# Patient Record
Sex: FEMALE | Race: WHITE | NOT HISPANIC OR LATINO | Employment: PART TIME | URBAN - METROPOLITAN AREA
[De-identification: names, ages, dates, MRNs, and addresses within clinical notes are randomized per-mention and may not be internally consistent; named-entity substitution may affect disease eponyms.]

---

## 2017-11-05 ENCOUNTER — HOSPITAL ENCOUNTER (EMERGENCY)
Facility: HOSPITAL | Age: 47
Discharge: HOME | End: 2017-11-05
Attending: EMERGENCY MEDICINE | Admitting: EMERGENCY MEDICINE

## 2017-11-05 ENCOUNTER — APPOINTMENT (OUTPATIENT)
Dept: GENERAL RADIOLOGY | Facility: HOSPITAL | Age: 47
End: 2017-11-05

## 2017-11-05 VITALS
TEMPERATURE: 97.8 F | BODY MASS INDEX: 23.54 KG/M2 | WEIGHT: 150 LBS | RESPIRATION RATE: 16 BRPM | SYSTOLIC BLOOD PRESSURE: 121 MMHG | HEIGHT: 67 IN | HEART RATE: 89 BPM | DIASTOLIC BLOOD PRESSURE: 79 MMHG | OXYGEN SATURATION: 95 %

## 2017-11-05 DIAGNOSIS — S22.32XA CLOSED FRACTURE OF ONE RIB OF LEFT SIDE, INITIAL ENCOUNTER: Primary | ICD-10-CM

## 2017-11-05 DIAGNOSIS — R05.9 COUGH: ICD-10-CM

## 2017-11-05 PROCEDURE — 99284 EMERGENCY DEPT VISIT MOD MDM: CPT

## 2017-11-05 PROCEDURE — 94799 UNLISTED PULMONARY SVC/PX: CPT

## 2017-11-05 PROCEDURE — 71101 X-RAY EXAM UNILAT RIBS/CHEST: CPT

## 2017-11-05 RX ORDER — CITALOPRAM 10 MG/1
10 TABLET ORAL DAILY
COMMUNITY
End: 2020-07-23

## 2017-11-05 RX ORDER — HYDROCODONE BITARTRATE AND ACETAMINOPHEN 5; 325 MG/1; MG/1
1 TABLET ORAL EVERY 6 HOURS PRN
Qty: 16 TABLET | Refills: 0 | Status: SHIPPED | OUTPATIENT
Start: 2017-11-05 | End: 2019-09-05

## 2017-11-05 RX ORDER — PRAVASTATIN SODIUM 10 MG
10 TABLET ORAL DAILY
COMMUNITY
End: 2020-07-23

## 2017-11-05 RX ORDER — HYDROCODONE BITARTRATE AND ACETAMINOPHEN 7.5; 325 MG/1; MG/1
1 TABLET ORAL ONCE
Status: COMPLETED | OUTPATIENT
Start: 2017-11-05 | End: 2017-11-05

## 2017-11-05 RX ORDER — RAMIPRIL 2.5 MG/1
2.5 CAPSULE ORAL DAILY
COMMUNITY
End: 2020-07-23

## 2017-11-05 RX ADMIN — HYDROCODONE BITARTRATE AND ACETAMINOPHEN 1 TABLET: 7.5; 325 TABLET ORAL at 17:30

## 2017-11-05 NOTE — ED PROVIDER NOTES
"Pt c/o rib pain which began earlier today after she was coughing and felt a \"pop.\" She states that pain is worse with deep breathing. The pt has recently been ill with a fever which lasted for a few days. The pt was seen at the [facility] and was given cough syrup and an antibiotic. She states that her fever has since resolved. The pt is currently taking Augmentin.    PEx  Heart: RRR, no murmur  Lungs: clear bilaterally  Musc: Tenderness mid and lower lateral and anterior left chest wall  Abd: normal active bowel sounds, soft, non-tender    Agree with plan to discharge pt with pain medication and a incentive spirometer.       I supervised care provided by the midlevel provider.    We have discussed this patient's history, physical exam, and treatment plan.   I have reviewed the note and personally saw and examined the patient and agree with the plan of care.    Documentation assistance provided by pamela Mari for Dr. Lynn.  Information recorded by the scribe was done at my direction and has been verified and validated by me.       Terri Mari  11/05/17 7717       Monroe Lynn MD  11/05/17 5582    "

## 2017-11-05 NOTE — ED PROVIDER NOTES
"EMERGENCY DEPARTMENT ENCOUNTER    CHIEF COMPLAINT  Chief Complaint: L lateral chest pain  History given by:Pt  History limited by:Nothing  Room Number: 14/14  PMD: Monroe Luna MD      HPI:  Pt is a 47 y.o. female who presents with L lateral chest pain and \"pop\" after a coughing spell an hour PTA. She reports her rib pain is worse with deep breath. Pt reports having the flu shot two weeks ago with persistent cough since. She reports a fever that lasted 4-5 days after her flu shot that has since resolved. She reports she is on Augmentin.     Duration: PTA  Timing:constant  Location:L ribs  Radiation:none  Quality:\"pop\"  Intensity/Severity:moderate  Progression:unchanged  Associated Symptoms:cough, fever lasting 4-5 days that has resolved PTA  Aggravating Factors:deep breath  Alleviating Factors:none  Previous Episodes:None stated  Treatment before arrival:Pt reports having a flu shot two weeks ago which gave her a persistent cough    MEDICAL RECORD REVIEW  Pt has a hx of diabetes.    PAST MEDICAL HISTORY  Active Ambulatory Problems     Diagnosis Date Noted   • No Active Ambulatory Problems     Resolved Ambulatory Problems     Diagnosis Date Noted   • No Resolved Ambulatory Problems     Past Medical History:   Diagnosis Date   • Depression    • Diabetes mellitus    • Hyperlipidemia        PAST SURGICAL HISTORY  History reviewed. No pertinent surgical history.    FAMILY HISTORY  History reviewed. No pertinent family history.    SOCIAL HISTORY  Social History     Social History   • Marital status:      Spouse name: N/A   • Number of children: N/A   • Years of education: N/A     Occupational History   • Not on file.     Social History Main Topics   • Smoking status: Never Smoker   • Smokeless tobacco: Not on file   • Alcohol use No   • Drug use: No   • Sexual activity: Not on file     Other Topics Concern   • Not on file     Social History Narrative   • No narrative on file "       ALLERGIES  Oxycodone-acetaminophen    REVIEW OF SYSTEMS  Review of Systems   Constitutional: Positive for fever (resolved PTA).   Respiratory: Positive for cough. Negative for shortness of breath.    Cardiovascular: Positive for chest pain (L lateral ribs).       PHYSICAL EXAM  ED Triage Vitals   Temp Heart Rate Resp BP SpO2   11/05/17 1436 11/05/17 1436 11/05/17 1436 11/05/17 1436 11/05/17 1436   97.8 °F (36.6 °C) 108 18 119/85 99 %      Temp src Heart Rate Source Patient Position BP Location FiO2 (%)   11/05/17 1436 11/05/17 1436 -- -- --   Tympanic Monitor          Physical Exam   Constitutional: She is oriented to person, place, and time and well-developed, well-nourished, and in no distress. No distress.   HENT:   Head: Normocephalic and atraumatic.   Mouth/Throat: Oropharynx is clear and moist.   Eyes: EOM are normal.   Neck: Normal range of motion. Neck supple.   Cardiovascular: Normal rate and regular rhythm.    Pulmonary/Chest: Effort normal and breath sounds normal. No respiratory distress. She has no wheezes. She exhibits tenderness (L lateral rib).   Abdominal: Soft. She exhibits no distension. There is tenderness in the left upper quadrant. There is no rebound.   Musculoskeletal: Normal range of motion. She exhibits no edema.   Lymphadenopathy:     She has no cervical adenopathy.   Neurological: She is alert and oriented to person, place, and time.   Skin: Skin is warm and dry. No rash noted. No pallor.   Psychiatric: Mood, memory, affect and judgment normal.   Nursing note and vitals reviewed.      RADIOLOGY  XR Ribs Left With PA Chest   Preliminary Result   1.  Left 7th rib fracture, may be old.   2.  No definite acute rib fractures or pneumothorax is seen.              I ordered the above noted radiological studies and reviewed the images on the PACS system.    COURSE & MEDICAL DECISION MAKING  Pertinent Imaging studies that were ordered and reviewed are noted above.  Results were  "reviewed/discussed with the patient and they were also made aware of online assess.  Pt also made aware that some labs, such as cultures, will not be resulted during ER visit and follow up with PMD is necessary.       PROGRESS AND CONSULTS    Progress Notes:    ED Course     1650  Reviewed pt's history and workup with Dr. Lynn.  After a bedside evaluation; Dr Lynn agrees with the plan of care    1714  Informed pt of her XR L rib results with shows a L 7th rib fracture. Informed pt of the plan to discharge home with an incentive spirometer and pain medication. Work note provided. All questions answered.     1714  The patient's history, physical exam, and imaging findings were discussed with the physician, who also performed a face to face history and physical exam.  I discussed all results and noted any abnormalities with patient.  Discussed absoute need to recheck abnormalities with their family physician.  I answered any of the patient's questions.  Discussed plan for discharge, as there is no emergent indication for admission.  Pt is agreeable and understands need for follow up and repeat testing.  Pt is aware that discharge does not mean that nothing is wrong but it indicates no emergency is present and they must continue care with their family physician.  Pt is discharged with instructions to follow up with primary care doctor to have their blood pressure rechecked.     1720  Incentive Spirometry ordered. Norco ordered for pain.    MEDICATIONS GIVEN IN ER  Medications   HYDROcodone-acetaminophen (NORCO) 7.5-325 MG per tablet 1 tablet (1 tablet Oral Given 11/5/17 1730)       /79  Pulse 89  Temp 97.8 °F (36.6 °C) (Tympanic)   Resp 16  Ht 67\" (170.2 cm)  Wt 150 lb (68 kg)  SpO2 95%  BMI 23.49 kg/m2      DIAGNOSIS  Final diagnoses:   Closed fracture of one rib of left side, initial encounter   Cough       FOLLOW UP   Monroe Luna MD  [address]  [facility] " [id number]  [phone]            RX     Medication List      New Prescriptions          HYDROcodone-acetaminophen 5-325 MG per tablet   Commonly known as:  NORCO   Take 1 tablet by mouth Every 6 (Six) Hours As Needed for Moderate Pain .         Stop          azithromycin 250 MG tablet   Commonly known as:  ZITHROMAX           Risks, benefits, alternatives discussed with patient.  Pt consents to treatment and agrees to follow up with PMD tomorrow for further care and any other prescriptions.       Documentation assistance provided by pamela Moreno for Digna Varghese PA-C.  Information recorded by the isaiasibalexsander was done at my direction and has been verified and validated by me.  Electronically signed by Biju Moreno on 11/5/2017 at time 6:23 PM       Biju Moreno  11/05/17 1827       Digna Varghese PA-C  11/05/17 1828

## 2017-11-05 NOTE — ED NOTES
"Patient states \"Earlier today I coughed and I felt a pop under my left boob and since then I've had pain in my left ribs.\" Patient denies any injury or other symptoms.     Monika Adhikari RN  11/05/17 4497    "

## 2017-11-05 NOTE — DISCHARGE INSTRUCTIONS
PLEASE READ AND REVIEW ALL DISCHARGE INSTRUCTIONS.     Please follow up with your primary care physician for any further evaluation/treatment and further management of your blood pressure.     Recheck in emergency department for any worsening and/or concerning symptoms.     Take all prescribed medicine as written and continue chronic medication.    Use incentive spirometer 4 times daily.  Continue antibiotic.  Pain medicine as needed.    DO NOT DRIVE WHILE TAKING PAIN MEDICINE.

## 2019-09-05 ENCOUNTER — APPOINTMENT (OUTPATIENT)
Dept: GENERAL RADIOLOGY | Facility: HOSPITAL | Age: 49
End: 2019-09-05

## 2019-09-05 ENCOUNTER — HOSPITAL ENCOUNTER (EMERGENCY)
Facility: HOSPITAL | Age: 49
Discharge: HOME | End: 2019-09-06
Attending: EMERGENCY MEDICINE | Admitting: EMERGENCY MEDICINE

## 2019-09-05 DIAGNOSIS — Z87.81 HISTORY OF RIB FRACTURE: ICD-10-CM

## 2019-09-05 DIAGNOSIS — S29.011A MUSCLE STRAIN OF CHEST WALL, INITIAL ENCOUNTER: Primary | ICD-10-CM

## 2019-09-05 PROCEDURE — 71101 X-RAY EXAM UNILAT RIBS/CHEST: CPT

## 2019-09-05 PROCEDURE — 99283 EMERGENCY DEPT VISIT LOW MDM: CPT

## 2019-09-05 RX ORDER — TRAMADOL HYDROCHLORIDE 50 MG/1
50 TABLET ORAL EVERY 6 HOURS PRN
Qty: 15 TABLET | Refills: 0 | Status: SHIPPED | OUTPATIENT
Start: 2019-09-05 | End: 2020-07-23

## 2019-09-05 RX ORDER — HYDROCODONE BITARTRATE AND ACETAMINOPHEN 7.5; 325 MG/1; MG/1
1 TABLET ORAL ONCE
Status: COMPLETED | OUTPATIENT
Start: 2019-09-05 | End: 2019-09-05

## 2019-09-05 RX ORDER — IBUPROFEN 600 MG/1
600 TABLET ORAL EVERY 8 HOURS PRN
Qty: 21 TABLET | Refills: 0 | Status: SHIPPED | OUTPATIENT
Start: 2019-09-05 | End: 2020-07-23

## 2019-09-05 RX ADMIN — HYDROCODONE BITARTRATE AND ACETAMINOPHEN 1 TABLET: 7.5; 325 TABLET ORAL at 23:28

## 2019-09-06 VITALS
HEART RATE: 98 BPM | SYSTOLIC BLOOD PRESSURE: 115 MMHG | OXYGEN SATURATION: 96 % | HEIGHT: 67 IN | TEMPERATURE: 97.5 F | BODY MASS INDEX: 23.49 KG/M2 | DIASTOLIC BLOOD PRESSURE: 70 MMHG | RESPIRATION RATE: 18 BRPM

## 2019-09-06 NOTE — ED PROVIDER NOTES
" EMERGENCY DEPARTMENT ENCOUNTER    CHIEF COMPLAINT  Chief Complaint: Left sided rib pain  History given by: Pt  History limited by: None  Room Number: 01/01  PMD: Monroe Luna MD      HPI:  Pt is a 49 y.o. 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 Pt heard a \"pop\" when she was lifting the patient. The pain radiates into her back and is aggravated with movement and deep inspiration.  Pt denies fever, CP, SOA, and abd pain.     Duration:  Several hours  Onset: gradual  Timing: constant   Location: left ribs  Quality: soreness  Intensity/Severity: moderate   Progression: worsening   Associated Symptoms: none  Aggravating Factors: movement   Alleviating Factors: none  Previous Episodes: h/o left 6th rib fracure  Treatment before arrival: none stated     PAST MEDICAL HISTORY  Active Ambulatory Problems     Diagnosis Date Noted   • No Active Ambulatory Problems     Resolved Ambulatory Problems     Diagnosis Date Noted   • No Resolved Ambulatory Problems     Past Medical History:   Diagnosis Date   • Depression    • Diabetes mellitus (CMS/[facility])    • Hyperlipidemia        PAST SURGICAL HISTORY  No past surgical history on file.    FAMILY HISTORY  No family history on file.    SOCIAL HISTORY  Social History     Socioeconomic History   • Marital status:      Spouse name: Not on file   • Number of children: Not on file   • Years of education: Not on file   • Highest education level: Not on file   Tobacco Use   • Smoking status: Never Smoker   Substance and Sexual Activity   • Alcohol use: No   • Drug use: No       ALLERGIES  Oxycodone-acetaminophen    REVIEW OF SYSTEMS  Review of Systems   Constitutional: Negative for fever.   HENT: Negative for sore throat.    Eyes: Negative.    Respiratory: Negative for cough and shortness of breath.    Cardiovascular: Negative for chest pain.   Gastrointestinal: Negative for " abdominal pain, diarrhea, nausea and vomiting.   Genitourinary: Negative for dysuria.   Musculoskeletal: Positive for arthralgias (left ribs). Negative for neck pain.   Skin: Negative for rash.   Allergic/Immunologic: Negative.    Neurological: Negative for weakness, numbness and headaches.   Hematological: Negative.    Psychiatric/Behavioral: Negative.    All other systems reviewed and are negative.      PHYSICAL EXAM  ED Triage Vitals [09/05/19 2051]   Temp Heart Rate Resp BP SpO2   97.4 °F (36.3 °C) 111 18 112/73 95 %      Temp src Heart Rate Source Patient Position BP Location FiO2 (%)   Tympanic Monitor -- -- --       Physical Exam   Constitutional: She is oriented to person, place, and time. No distress.   HENT:   Head: Normocephalic and atraumatic.   Eyes: EOM are normal. Pupils are equal, round, and reactive to light.   Neck: Normal range of motion. Neck supple.   Cardiovascular: Normal rate, regular rhythm and normal heart sounds.   Pulmonary/Chest: Effort normal and breath sounds normal. No respiratory distress. She exhibits tenderness (left lateral chest wall). She exhibits no crepitus.   Abdominal: Soft. Bowel sounds are normal. She exhibits no distension. There is no tenderness. There is no rebound and no guarding.   Musculoskeletal: Normal range of motion. She exhibits no edema.   Neurological: She is alert and oriented to person, place, and time. She has normal sensation and normal strength.   Skin: Skin is warm and dry. No rash noted.   Psychiatric: Mood and affect normal.   Nursing note and vitals reviewed.        RADIOLOGY  XR Ribs Left With PA Chest   Final Result   Left seventh rib fracture appears old and well-healed.       This report was finalized on 9/5/2019 11:37 PM by Earl Galeano M.D.               I ordered the above noted radiological studies. Interpreted by radiologist. Reviewed by me in PACS.       PROCEDURES  Procedures      PROGRESS AND CONSULTS   10:22 PM  XR left ribs ordered.      11:10 PM  Norco ordered for pain.     MEDICAL DECISION MAKING  Results were reviewed/discussed with the patient and they were also made aware of online access. Pt also made aware that some labs, such as cultures, will not be resulted during ER visit and follow up with PMD is necessary.     MDM  Number of Diagnoses or Management Options     Amount and/or Complexity of Data Reviewed  Tests in the radiology section of CPT®: ordered and reviewed  Decide to obtain previous medical records or to obtain history from someone other than the patient: yes  Review and summarize past medical records: yes           DIAGNOSIS  Final diagnoses:   Muscle strain of chest wall, initial encounter   History of rib fracture       DISPOSITION  DISCHARGE    Patient discharged in stable condition.    Reviewed implications of results, diagnosis, meds, responsibility to follow up, warning signs and symptoms of possible worsening, potential complications and reasons to return to ER.    Patient/Family voiced understanding of above instructions.    Discussed plan for discharge, as there is no emergent indication for admission. Patient referred to primary care provider for BP management due to today's BP. Pt/family is agreeable and understands need for follow up and repeat testing.  Pt is aware that discharge does not mean that nothing is wrong but it indicates no emergency is present that requires admission and they must continue care with follow-up as given below or physician of their choice.     FOLLOW-UP  Monroe Luna MD  [address]  [place] [address]  [phone]    Schedule an appointment as soon as possible for a visit            Medication List      New Prescriptions    ibuprofen 600 MG tablet  Commonly known as:  ADVIL,MOTRIN  Take 1 tablet by mouth Every 8 (Eight) Hours As Needed for Moderate Pain .     traMADol 50 MG tablet  Commonly known as:  ULTRAM  Take 1 tablet by mouth Every 6 (Six) Hours As Needed  for Moderate Pain .        Stop    HYDROcodone-acetaminophen 5-325 MG per tablet  Commonly known as:  NORCO          Latest Documented Vital Signs:  As of 12:35 AM  BP- 115/70 HR- 98 Temp- 97.5 °F (36.4 °C) O2 sat- 96%    --  Documentation assistance provided by pamela Herron for Dr. Lynn.  Information recorded by the scribe was done at my direction and has been verified and validated by me.     Rossy Herron  09/05/19 0242       Rossy Herron  09/05/19 2565       Monroe Lynn MD  09/06/19 0036

## 2019-09-06 NOTE — DISCHARGE INSTRUCTIONS
Use medications as needed for pain.  Ice your left chest wall for up to 20 minutes at a time.  Please return to the emergency department if develop trouble breathing or fever.

## 2019-09-06 NOTE — ED NOTES
Pt was assisting pt back to bed and felt a pop in her left side, c/o rib pain.     Narda Lira RN  09/05/19 2049

## 2020-07-23 ENCOUNTER — OFFICE VISIT (OUTPATIENT)
Dept: FAMILY MEDICINE CLINIC | Facility: CLINIC | Age: 50
End: 2020-07-23

## 2020-07-23 VITALS
HEIGHT: 67 IN | BODY MASS INDEX: 29.02 KG/M2 | OXYGEN SATURATION: 98 % | WEIGHT: 184.9 LBS | DIASTOLIC BLOOD PRESSURE: 70 MMHG | TEMPERATURE: 98.2 F | HEART RATE: 70 BPM | SYSTOLIC BLOOD PRESSURE: 118 MMHG

## 2020-07-23 DIAGNOSIS — E78.5 HYPERLIPIDEMIA, UNSPECIFIED HYPERLIPIDEMIA TYPE: ICD-10-CM

## 2020-07-23 DIAGNOSIS — M81.0 OSTEOPOROSIS, UNSPECIFIED OSTEOPOROSIS TYPE, UNSPECIFIED PATHOLOGICAL FRACTURE PRESENCE: ICD-10-CM

## 2020-07-23 DIAGNOSIS — I10 ESSENTIAL HYPERTENSION: Primary | ICD-10-CM

## 2020-07-23 DIAGNOSIS — Z13.0 SCREENING FOR IRON DEFICIENCY ANEMIA: ICD-10-CM

## 2020-07-23 DIAGNOSIS — F32.A DEPRESSION, UNSPECIFIED DEPRESSION TYPE: ICD-10-CM

## 2020-07-23 DIAGNOSIS — M72.2 PLANTAR FASCIITIS: ICD-10-CM

## 2020-07-23 DIAGNOSIS — Z12.39 BREAST CANCER SCREENING: ICD-10-CM

## 2020-07-23 DIAGNOSIS — E11.9 TYPE 2 DIABETES MELLITUS WITHOUT COMPLICATION, WITHOUT LONG-TERM CURRENT USE OF INSULIN (HCC): ICD-10-CM

## 2020-07-23 PROCEDURE — 99204 OFFICE O/P NEW MOD 45 MIN: CPT | Performed by: NURSE PRACTITIONER

## 2020-07-23 RX ORDER — LORATADINE 10 MG/1
10 TABLET ORAL DAILY
COMMUNITY

## 2020-07-23 RX ORDER — ALENDRONATE SODIUM 70 MG/1
70 TABLET ORAL
COMMUNITY
Start: 2019-01-14 | End: 2020-07-23 | Stop reason: SDUPTHER

## 2020-07-23 RX ORDER — SODIUM PHOSPHATE,MONO-DIBASIC 19G-7G/118
ENEMA (ML) RECTAL DAILY
COMMUNITY

## 2020-07-23 RX ORDER — GUAIFENESIN 600 MG/1
TABLET, EXTENDED RELEASE ORAL
COMMUNITY

## 2020-07-23 RX ORDER — ASPIRIN 81 MG/1
81 TABLET ORAL DAILY
COMMUNITY

## 2020-07-23 RX ORDER — CITALOPRAM 20 MG/1
20 TABLET ORAL DAILY
COMMUNITY
Start: 2019-01-14 | End: 2020-07-23 | Stop reason: SDUPTHER

## 2020-07-23 RX ORDER — ALENDRONATE SODIUM 70 MG/1
70 TABLET ORAL
Qty: 12 TABLET | Refills: 3 | Status: SHIPPED | OUTPATIENT
Start: 2020-07-23 | End: 2021-07-07

## 2020-07-23 RX ORDER — PRAVASTATIN SODIUM 80 MG/1
80 TABLET ORAL DAILY
Qty: 90 TABLET | Refills: 3 | Status: SHIPPED | OUTPATIENT
Start: 2020-07-23

## 2020-07-23 RX ORDER — MELATONIN
COMMUNITY
Start: 2020-04-16

## 2020-07-23 RX ORDER — CITALOPRAM 20 MG/1
20 TABLET ORAL DAILY
Qty: 90 TABLET | Refills: 3 | Status: SHIPPED | OUTPATIENT
Start: 2020-07-23

## 2020-07-23 RX ORDER — PRAVASTATIN SODIUM 80 MG/1
80 TABLET ORAL DAILY
COMMUNITY
Start: 2019-01-14 | End: 2020-07-23 | Stop reason: SDUPTHER

## 2020-07-23 RX ORDER — RAMIPRIL 2.5 MG/1
CAPSULE ORAL
COMMUNITY
Start: 2020-04-01

## 2020-07-23 RX ORDER — RIBOFLAVIN (VITAMIN B2) 100 MG
TABLET ORAL DAILY
COMMUNITY
End: 2020-11-03

## 2020-07-23 NOTE — PROGRESS NOTES
"Subjective   Seble Delgadillo is a 50 y.o. female.     History of Present Illness   New patient presenting to me today    Left heel pain on the bottom of her foot that achy at times.  Hurts worse in the morning.  Hurting for a month.  Off and on achy sharp pain.  She only wears tennis shoes bc of foot pain. Ps 7 tried IBU and it does help a little and she has a gel pad in shoes.    Patient has not been to the doctor in a long time needing all of her medications refilled.  She has been a diabetic for a while has not had her A1c checked for a while but she is taking her medications daily as directed.    She is also on Celexa 20 mg which she is using daily as directed for depression and is working well without any side effects.    She is also on blood pressure medication with a well-controlled blood pressure no issues with that at home.    She is taking a statin for high cholesterol and diabetes which she is using as directed.  She is fasting for blood work today.    She is on Fosamax for history of several rib fractures she states she she has no idea if she is had a bone density scan she is done with Fosamax for 2 years we will go ahead and get her set up for 1 of these.  She is also at a date for her mammogram we will set her up for 1 of these.  Her last colonoscopy was 2 years ago and she is required to have those every 5 it was normal.    The following portions of the patient's history were reviewed and updated as appropriate: allergies, current medications, past social history and problem list.    Review of Systems   Musculoskeletal:        Foot pain   All other systems reviewed and are negative.      Objective   /70   Pulse 70   Temp 98.2 °F (36.8 °C)   Ht 170.2 cm (67.01\")   Wt 83.9 kg (184 lb 14.4 oz)   SpO2 98%   BMI 28.95 kg/m²   Physical Exam   Constitutional: She is oriented to person, place, and time. Vital signs are normal. She appears well-developed and well-nourished. No distress.   HENT: "   Head: Normocephalic and atraumatic. Hair is normal.   Right Ear: Hearing, tympanic membrane, external ear and ear canal normal. No drainage. No decreased hearing is noted.   Left Ear: Hearing, tympanic membrane, external ear and ear canal normal. No decreased hearing is noted.   Nose: No nasal deformity.   Mouth/Throat: Oropharynx is clear and moist.   Eyes: Pupils are equal, round, and reactive to light. Conjunctivae, EOM and lids are normal. Right eye exhibits no discharge. Left eye exhibits no discharge.   Neck: Normal range of motion. Neck supple. No JVD present. No tracheal deviation present. No thyromegaly present.   Cardiovascular: Normal rate, regular rhythm, normal heart sounds, intact distal pulses and normal pulses. Exam reveals no gallop and no friction rub.   No murmur heard.  Pulmonary/Chest: Effort normal and breath sounds normal. No respiratory distress. She has no wheezes. She has no rales. She exhibits no tenderness.   Abdominal: Soft. Bowel sounds are normal. She exhibits no distension and no mass. There is no tenderness. There is no rebound and no guarding. No hernia.   Musculoskeletal: Normal range of motion. She exhibits no edema, tenderness or deformity.   Lymphadenopathy:     She has no cervical adenopathy.   Neurological: She is alert and oriented to person, place, and time. She has normal reflexes. She displays normal reflexes. No cranial nerve deficit. She exhibits normal muscle tone. Coordination and gait normal.   Skin: Skin is warm and dry. No rash noted. She is not diaphoretic. No erythema.   Psychiatric: She has a normal mood and affect. Her behavior is normal. Judgment and thought content normal.   Vitals reviewed.      Assessment/Plan      Diagnosis Plan   1. Essential hypertension     2. Type 2 diabetes mellitus without complication, without long-term current use of insulin (CMS/[facility])  Comprehensive Metabolic Panel    Hemoglobin A1c   3. Depression, unspecified depression type   citalopram (CeleXA) 20 MG tablet   4. Hyperlipidemia, unspecified hyperlipidemia type  Comprehensive Metabolic Panel    Lipid Panel With LDL / HDL Ratio    pravastatin (PRAVACHOL) 80 MG tablet   5. Osteoporosis, unspecified osteoporosis type, unspecified pathological fracture presence  DEXA Bone Density Axial    alendronate (FOSAMAX) 70 MG tablet   6. Screening for iron deficiency anemia  CBC & Differential   7. Breast cancer screening  Mammo Screening Bilateral With CAD   8. Plantar fasciitis       Follow up after labs   Cont with meds as directed  Ice, IBU, inserts rto for injection if no improvement     Bryan Dowell, TIMOTHY  7/23/2020

## 2020-07-24 LAB
ALBUMIN SERPL-MCNC: 4.6 G/DL (ref 3.5–5.2)
ALBUMIN/GLOB SERPL: 1.5 G/DL
ALP SERPL-CCNC: 96 U/L (ref 39–117)
ALT SERPL-CCNC: 29 U/L (ref 1–33)
AST SERPL-CCNC: 20 U/L (ref 1–32)
BASOPHILS # BLD AUTO: 0.03 10*3/MM3 (ref 0–0.2)
BASOPHILS NFR BLD AUTO: 0.6 % (ref 0–1.5)
BILIRUB SERPL-MCNC: 0.5 MG/DL (ref 0–1.2)
BUN SERPL-MCNC: 13 MG/DL (ref 6–20)
BUN/CREAT SERPL: 17.6 (ref 7–25)
CALCIUM SERPL-MCNC: 9.5 MG/DL (ref 8.6–10.5)
CHLORIDE SERPL-SCNC: 99 MMOL/L (ref 98–107)
CHOLEST SERPL-MCNC: 234 MG/DL (ref 0–200)
CO2 SERPL-SCNC: 28.3 MMOL/L (ref 22–29)
CREAT SERPL-MCNC: 0.74 MG/DL (ref 0.57–1)
EOSINOPHIL # BLD AUTO: 0.17 10*3/MM3 (ref 0–0.4)
EOSINOPHIL NFR BLD AUTO: 3.3 % (ref 0.3–6.2)
ERYTHROCYTE [DISTWIDTH] IN BLOOD BY AUTOMATED COUNT: 12.8 % (ref 12.3–15.4)
GLOBULIN SER CALC-MCNC: 3 GM/DL
GLUCOSE SERPL-MCNC: 279 MG/DL (ref 65–99)
HBA1C MFR BLD: 10.3 % (ref 4.8–5.6)
HCT VFR BLD AUTO: 45.8 % (ref 34–46.6)
HDLC SERPL-MCNC: 55 MG/DL (ref 40–60)
HGB BLD-MCNC: 15.1 G/DL (ref 12–15.9)
IMM GRANULOCYTES # BLD AUTO: 0.01 10*3/MM3 (ref 0–0.05)
IMM GRANULOCYTES NFR BLD AUTO: 0.2 % (ref 0–0.5)
LDLC SERPL CALC-MCNC: 138 MG/DL (ref 0–100)
LDLC/HDLC SERPL: 2.51 {RATIO}
LYMPHOCYTES # BLD AUTO: 1.52 10*3/MM3 (ref 0.7–3.1)
LYMPHOCYTES NFR BLD AUTO: 29.2 % (ref 19.6–45.3)
MCH RBC QN AUTO: 30.5 PG (ref 26.6–33)
MCHC RBC AUTO-ENTMCNC: 33 G/DL (ref 31.5–35.7)
MCV RBC AUTO: 92.5 FL (ref 79–97)
MONOCYTES # BLD AUTO: 0.41 10*3/MM3 (ref 0.1–0.9)
MONOCYTES NFR BLD AUTO: 7.9 % (ref 5–12)
NEUTROPHILS # BLD AUTO: 3.06 10*3/MM3 (ref 1.7–7)
NEUTROPHILS NFR BLD AUTO: 58.8 % (ref 42.7–76)
NRBC BLD AUTO-RTO: 0 /100 WBC (ref 0–0.2)
PLATELET # BLD AUTO: 190 10*3/MM3 (ref 140–450)
POTASSIUM SERPL-SCNC: 4 MMOL/L (ref 3.5–5.2)
PROT SERPL-MCNC: 7.6 G/DL (ref 6–8.5)
RBC # BLD AUTO: 4.95 10*6/MM3 (ref 3.77–5.28)
SODIUM SERPL-SCNC: 139 MMOL/L (ref 136–145)
TRIGL SERPL-MCNC: 206 MG/DL (ref 0–150)
VLDLC SERPL CALC-MCNC: 41.2 MG/DL
WBC # BLD AUTO: 5.2 10*3/MM3 (ref 3.4–10.8)

## 2020-07-31 NOTE — TELEPHONE ENCOUNTER
Pt was seen on 7/23 with Bryan and needs these meds filled     JARDIANCE 25 MG   1 TABLET A DAY     RANITIDINE   150MG I TAB TWEICE A DAY

## 2020-08-05 ENCOUNTER — TELEPHONE (OUTPATIENT)
Dept: FAMILY MEDICINE CLINIC | Facility: CLINIC | Age: 50
End: 2020-08-05

## 2020-08-05 NOTE — TELEPHONE ENCOUNTER
Patient wanted a refill of zantac 150mg prescription is not an active medication on her list. Do you want to give her a prescription or is that one of the medications that was pulled off market due to side effects?

## 2020-08-06 ENCOUNTER — TELEPHONE (OUTPATIENT)
Dept: FAMILY MEDICINE CLINIC | Facility: CLINIC | Age: 50
End: 2020-08-06

## 2020-08-06 RX ORDER — PEN NEEDLE, DIABETIC 33 GX5/32"
1 NEEDLE, DISPOSABLE MISCELLANEOUS NIGHTLY
Qty: 100 EACH | Refills: 3 | Status: SHIPPED | OUTPATIENT
Start: 2020-08-06 | End: 2020-11-03

## 2020-08-06 NOTE — TELEPHONE ENCOUNTER
It wont let me order this medication it says it has been pulled off the market gives option for famotidine

## 2020-08-07 RX ORDER — OMEPRAZOLE 20 MG/1
20 CAPSULE, DELAYED RELEASE ORAL DAILY
Qty: 90 CAPSULE | Refills: 3 | Status: SHIPPED | OUTPATIENT
Start: 2020-08-07 | End: 2020-11-03

## 2020-10-26 RX ORDER — SITAGLIPTIN 100 MG/1
TABLET, FILM COATED ORAL
Qty: 90 TABLET | Refills: 2 | Status: SHIPPED | OUTPATIENT
Start: 2020-10-26

## 2020-11-03 ENCOUNTER — OFFICE VISIT (OUTPATIENT)
Dept: FAMILY MEDICINE CLINIC | Facility: CLINIC | Age: 50
End: 2020-11-03

## 2020-11-03 VITALS
BODY MASS INDEX: 29.41 KG/M2 | SYSTOLIC BLOOD PRESSURE: 146 MMHG | TEMPERATURE: 97.1 F | DIASTOLIC BLOOD PRESSURE: 80 MMHG | OXYGEN SATURATION: 97 % | HEART RATE: 93 BPM | HEIGHT: 67 IN | WEIGHT: 187.4 LBS

## 2020-11-03 DIAGNOSIS — K21.9 GASTROESOPHAGEAL REFLUX DISEASE WITHOUT ESOPHAGITIS: ICD-10-CM

## 2020-11-03 DIAGNOSIS — Z12.31 ENCOUNTER FOR SCREENING MAMMOGRAM FOR MALIGNANT NEOPLASM OF BREAST: ICD-10-CM

## 2020-11-03 DIAGNOSIS — Z91.09 ENVIRONMENTAL ALLERGIES: ICD-10-CM

## 2020-11-03 DIAGNOSIS — E78.5 HYPERLIPIDEMIA, UNSPECIFIED HYPERLIPIDEMIA TYPE: ICD-10-CM

## 2020-11-03 DIAGNOSIS — G47.00 INSOMNIA, UNSPECIFIED TYPE: ICD-10-CM

## 2020-11-03 DIAGNOSIS — E11.9 TYPE 2 DIABETES MELLITUS WITHOUT COMPLICATION, WITHOUT LONG-TERM CURRENT USE OF INSULIN (HCC): Primary | ICD-10-CM

## 2020-11-03 DIAGNOSIS — I10 ESSENTIAL HYPERTENSION: ICD-10-CM

## 2020-11-03 LAB — HBA1C MFR BLD: 12.2 %

## 2020-11-03 PROCEDURE — 83036 HEMOGLOBIN GLYCOSYLATED A1C: CPT | Performed by: NURSE PRACTITIONER

## 2020-11-03 PROCEDURE — 99214 OFFICE O/P EST MOD 30 MIN: CPT | Performed by: NURSE PRACTITIONER

## 2020-11-03 RX ORDER — PEN NEEDLE, DIABETIC 32GX 5/32"
NEEDLE, DISPOSABLE MISCELLANEOUS
COMMUNITY
Start: 2020-10-30

## 2020-11-03 RX ORDER — FLUTICASONE PROPIONATE 50 MCG
2 SPRAY, SUSPENSION (ML) NASAL DAILY
Qty: 3 BOTTLE | Refills: 3 | Status: SHIPPED | OUTPATIENT
Start: 2020-11-03

## 2020-11-03 RX ORDER — DOXEPIN HYDROCHLORIDE 10 MG/1
10 CAPSULE ORAL NIGHTLY
Qty: 90 CAPSULE | Refills: 3 | Status: SHIPPED | OUTPATIENT
Start: 2020-11-03

## 2020-11-03 RX ORDER — OMEPRAZOLE 40 MG/1
40 CAPSULE, DELAYED RELEASE ORAL DAILY
Qty: 90 CAPSULE | Refills: 3 | Status: SHIPPED | OUTPATIENT
Start: 2020-11-03

## 2020-11-03 NOTE — PROGRESS NOTES
"Subjective   Seble Delgadillo is a 50 y.o. female.     History of Present Illness    Since the last visit, she has overall felt well.  She has Essential Hypertension and well controlled on current medication, DMII not controlled on current regimen and will add/adjust medication, work on diet and exercise, get follow up labs and Gerd is not controlled and will adjust medication, allergies not controlled will add medication.  she has been compliant with current medications have reviewed them.  The patient denies medication side effects.  Will refill medications. /80   Pulse 93   Temp 97.1 °F (36.2 °C)   Ht 170.2 cm (67.01\")   Wt 85 kg (187 lb 6.4 oz)   SpO2 97%   BMI 29.34 kg/m²     Results for orders placed or performed in visit on 11/03/20   POC Glycosylated Hemoglobin (Hb A1C)    Specimen: Blood   Result Value Ref Range    Hemoglobin A1C 12.2 %     Pt with complaints of insomnia patient states that she can fall asleep very easily but she wakes up several times throughout the night and takes her quite a while to get back to sleep.  She never use anything for insomnia in the past besides over-the-counter melatonin which does help her fall asleep it does not help her stay asleep.    The following portions of the patient's history were reviewed and updated as appropriate: allergies, current medications, past social history and problem list.    Review of Systems   HENT: Positive for postnasal drip and rhinorrhea.    All other systems reviewed and are negative.      Objective   /80   Pulse 93   Temp 97.1 °F (36.2 °C)   Ht 170.2 cm (67.01\")   Wt 85 kg (187 lb 6.4 oz)   SpO2 97%   BMI 29.34 kg/m²   Physical Exam  Vitals signs reviewed.   Constitutional:       General: She is not in acute distress.     Appearance: She is well-developed.   HENT:      Head: Normocephalic.   Cardiovascular:      Rate and Rhythm: Normal rate and regular rhythm.      Heart sounds: Normal heart sounds.   Pulmonary:      " Effort: Pulmonary effort is normal.      Breath sounds: Normal breath sounds.   Neurological:      Mental Status: She is alert and oriented to person, place, and time.      Gait: Gait normal.   Psychiatric:         Behavior: Behavior normal.         Thought Content: Thought content normal.         Judgment: Judgment normal.         Assessment/Plan      Diagnosis Plan   1. Type 2 diabetes mellitus without complication, without long-term current use of insulin (CMS/[facility])  POC Glycosylated Hemoglobin (Hb A1C)    C-Peptide   2. Hyperlipidemia, unspecified hyperlipidemia type     3. Essential hypertension     4. Gastroesophageal reflux disease without esophagitis  omeprazole (PrilOSEC) 40 MG capsule   5. Environmental allergies  fluticasone (Flonase) 50 MCG/ACT nasal spray   6. Insomnia, unspecified type  doxepin (SINEquan) 10 MG capsule   7. Encounter for screening mammogram for malignant neoplasm of breast  Mammo Screening Bilateral With CAD     Increase insulin to 30units for 1 week and then 40 units  Follow up after lab  rto in 3 mons for a1c depending lab results will refer to endocrinologist  Work on diet   Add doxepin for sleep    I am concerned that she might be trending towards a type I diabetic, and check a C-peptide we will follow-up after that.    Mask and googles worn    Bryan Dowell, TIMOTHY  11/3/2020

## 2020-11-04 DIAGNOSIS — E11.9 TYPE 2 DIABETES MELLITUS WITHOUT COMPLICATION, WITHOUT LONG-TERM CURRENT USE OF INSULIN (HCC): Primary | ICD-10-CM

## 2020-11-04 LAB — C PEPTIDE SERPL-MCNC: 3.7 NG/ML (ref 1.1–4.4)

## 2020-12-22 ENCOUNTER — IMMUNIZATION (OUTPATIENT)
Dept: VACCINE CLINIC | Facility: HOSPITAL | Age: 50
End: 2020-12-22

## 2020-12-22 PROCEDURE — 0001A: CPT | Performed by: INTERNAL MEDICINE

## 2020-12-22 PROCEDURE — 91300 HC SARSCOV02 VAC 30MCG/0.3ML IM: CPT | Performed by: INTERNAL MEDICINE

## 2021-01-06 ENCOUNTER — HOSPITAL ENCOUNTER (OUTPATIENT)
Dept: MAMMOGRAPHY | Facility: HOSPITAL | Age: 51
End: 2021-01-06

## 2021-01-12 ENCOUNTER — IMMUNIZATION (OUTPATIENT)
Dept: VACCINE CLINIC | Facility: HOSPITAL | Age: 51
End: 2021-01-12

## 2021-01-12 PROCEDURE — 91300 HC SARSCOV02 VAC 30MCG/0.3ML IM: CPT | Performed by: INTERNAL MEDICINE

## 2021-01-12 PROCEDURE — 0001A: CPT | Performed by: INTERNAL MEDICINE

## 2021-01-12 PROCEDURE — 0002A: CPT | Performed by: INTERNAL MEDICINE

## 2021-04-20 ENCOUNTER — TELEPHONE (OUTPATIENT)
Dept: FAMILY MEDICINE CLINIC | Facility: CLINIC | Age: 51
End: 2021-04-20

## 2021-05-16 ENCOUNTER — HOSPITAL ENCOUNTER (EMERGENCY)
Facility: HOSPITAL | Age: 51
Discharge: HOME | End: 2021-05-16
Attending: EMERGENCY MEDICINE | Admitting: EMERGENCY MEDICINE

## 2021-05-16 VITALS
WEIGHT: 170 LBS | SYSTOLIC BLOOD PRESSURE: 136 MMHG | HEART RATE: 98 BPM | BODY MASS INDEX: 27.32 KG/M2 | RESPIRATION RATE: 16 BRPM | TEMPERATURE: 97.4 F | OXYGEN SATURATION: 96 % | HEIGHT: 66 IN | DIASTOLIC BLOOD PRESSURE: 90 MMHG

## 2021-05-16 DIAGNOSIS — H57.12 ACUTE LEFT EYE PAIN: Primary | ICD-10-CM

## 2021-05-16 DIAGNOSIS — H10.9 CONJUNCTIVITIS OF LEFT EYE, UNSPECIFIED CONJUNCTIVITIS TYPE: ICD-10-CM

## 2021-05-16 PROCEDURE — 99284 EMERGENCY DEPT VISIT MOD MDM: CPT

## 2021-05-16 RX ORDER — ERYTHROMYCIN 5 MG/G
1 OINTMENT OPHTHALMIC ONCE
Status: COMPLETED | OUTPATIENT
Start: 2021-05-16 | End: 2021-05-16

## 2021-05-16 RX ORDER — HYDROCODONE BITARTRATE AND ACETAMINOPHEN 7.5; 325 MG/1; MG/1
1 TABLET ORAL EVERY 8 HOURS PRN
Qty: 3 TABLET | Refills: 0 | Status: SHIPPED | OUTPATIENT
Start: 2021-05-16

## 2021-05-16 RX ORDER — HYDROCODONE BITARTRATE AND ACETAMINOPHEN 7.5; 325 MG/1; MG/1
1 TABLET ORAL ONCE
Status: COMPLETED | OUTPATIENT
Start: 2021-05-16 | End: 2021-05-16

## 2021-05-16 RX ORDER — TETRACAINE HYDROCHLORIDE 5 MG/ML
2 SOLUTION OPHTHALMIC ONCE
Status: COMPLETED | OUTPATIENT
Start: 2021-05-16 | End: 2021-05-16

## 2021-05-16 RX ADMIN — HYDROCODONE BITARTRATE AND ACETAMINOPHEN 1 TABLET: 7.5; 325 TABLET ORAL at 23:26

## 2021-05-16 RX ADMIN — ERYTHROMYCIN 1 APPLICATION: 5 OINTMENT OPHTHALMIC at 23:16

## 2021-05-16 RX ADMIN — FLUORESCEIN SODIUM 1 STRIP: 1 STRIP OPHTHALMIC at 21:04

## 2021-05-16 RX ADMIN — TETRACAINE HYDROCHLORIDE 2 DROP: 5 SOLUTION OPHTHALMIC at 21:03

## 2021-07-07 DIAGNOSIS — M81.0 OSTEOPOROSIS, UNSPECIFIED OSTEOPOROSIS TYPE, UNSPECIFIED PATHOLOGICAL FRACTURE PRESENCE: ICD-10-CM

## 2021-07-08 RX ORDER — ALENDRONATE SODIUM 70 MG/1
70 TABLET ORAL
Qty: 12 TABLET | Refills: 3 | Status: SHIPPED | OUTPATIENT
Start: 2021-07-08

## 2021-07-16 DIAGNOSIS — F32.A DEPRESSION, UNSPECIFIED DEPRESSION TYPE: ICD-10-CM

## 2021-07-16 DIAGNOSIS — E78.5 HYPERLIPIDEMIA, UNSPECIFIED HYPERLIPIDEMIA TYPE: ICD-10-CM

## 2021-07-16 RX ORDER — CITALOPRAM 20 MG/1
TABLET ORAL
Qty: 30 TABLET | Refills: 0 | OUTPATIENT
Start: 2021-07-16

## 2021-07-16 RX ORDER — PRAVASTATIN SODIUM 80 MG/1
TABLET ORAL
Qty: 30 TABLET | Refills: 0 | OUTPATIENT
Start: 2021-07-16

## 2022-10-05 ENCOUNTER — IMMUNIZATION (OUTPATIENT)
Dept: VACCINE CLINIC | Facility: HOSPITAL | Age: 52
End: 2022-10-05

## 2022-10-05 DIAGNOSIS — Z23 NEED FOR VACCINATION: Primary | ICD-10-CM

## 2022-10-05 PROCEDURE — 91312 HC SARSCOV2 VAC 30MCG/0.3ML IM BIVALENT BOOSTER 12 YRS AND OLDER: CPT | Performed by: INTERNAL MEDICINE

## 2022-10-05 PROCEDURE — 0124A: CPT | Performed by: INTERNAL MEDICINE

## 2022-12-22 ENCOUNTER — HOSPITAL ENCOUNTER (EMERGENCY)
Facility: HOSPITAL | Age: 52
Discharge: HOME | End: 2022-12-22
Attending: EMERGENCY MEDICINE | Admitting: EMERGENCY MEDICINE

## 2022-12-22 VITALS
WEIGHT: 200 LBS | RESPIRATION RATE: 20 BRPM | HEART RATE: 79 BPM | HEIGHT: 66 IN | DIASTOLIC BLOOD PRESSURE: 68 MMHG | SYSTOLIC BLOOD PRESSURE: 126 MMHG | BODY MASS INDEX: 32.14 KG/M2 | OXYGEN SATURATION: 92 % | TEMPERATURE: 97.1 F

## 2022-12-22 DIAGNOSIS — E11.65 TYPE 2 DIABETES MELLITUS WITH HYPERGLYCEMIA, WITHOUT LONG-TERM CURRENT USE OF INSULIN: Primary | ICD-10-CM

## 2022-12-22 DIAGNOSIS — R29.898 BILATERAL LEG WEAKNESS: ICD-10-CM

## 2022-12-22 DIAGNOSIS — E11.65 POORLY CONTROLLED DIABETES MELLITUS: ICD-10-CM

## 2022-12-22 LAB
ALBUMIN SERPL-MCNC: 3.6 G/DL (ref 3.5–5.2)
ALBUMIN/GLOB SERPL: 1.2 G/DL
ALP SERPL-CCNC: 86 U/L (ref 39–117)
ALT SERPL W P-5'-P-CCNC: 35 U/L (ref 1–33)
ANION GAP SERPL CALCULATED.3IONS-SCNC: 9.9 MMOL/L (ref 5–15)
AST SERPL-CCNC: 43 U/L (ref 1–32)
BASOPHILS # BLD AUTO: 0.03 10*3/MM3 (ref 0–0.2)
BASOPHILS NFR BLD AUTO: 0.5 % (ref 0–1.5)
BILIRUB SERPL-MCNC: 0.5 MG/DL (ref 0–1.2)
BILIRUB UR QL STRIP: NEGATIVE
BUN SERPL-MCNC: 14 MG/DL (ref 6–20)
BUN/CREAT SERPL: 21.2 (ref 7–25)
CALCIUM SPEC-SCNC: 8.6 MG/DL (ref 8.6–10.5)
CHLORIDE SERPL-SCNC: 103 MMOL/L (ref 98–107)
CLARITY UR: CLEAR
CO2 SERPL-SCNC: 22.1 MMOL/L (ref 22–29)
COLOR UR: YELLOW
CREAT SERPL-MCNC: 0.66 MG/DL (ref 0.57–1)
DEPRECATED RDW RBC AUTO: 46.1 FL (ref 37–54)
EGFRCR SERPLBLD CKD-EPI 2021: 104.4 ML/MIN/1.73
EOSINOPHIL # BLD AUTO: 0.12 10*3/MM3 (ref 0–0.4)
EOSINOPHIL NFR BLD AUTO: 2 % (ref 0.3–6.2)
ERYTHROCYTE [DISTWIDTH] IN BLOOD BY AUTOMATED COUNT: 13.6 % (ref 12.3–15.4)
GLOBULIN UR ELPH-MCNC: 3.1 GM/DL
GLUCOSE BLDC GLUCOMTR-MCNC: 249 MG/DL (ref 70–130)
GLUCOSE SERPL-MCNC: 190 MG/DL (ref 65–99)
GLUCOSE UR STRIP-MCNC: ABNORMAL MG/DL
HCT VFR BLD AUTO: 39.3 % (ref 34–46.6)
HGB BLD-MCNC: 12.9 G/DL (ref 12–15.9)
HGB UR QL STRIP.AUTO: NEGATIVE
IMM GRANULOCYTES # BLD AUTO: 0.01 10*3/MM3 (ref 0–0.05)
IMM GRANULOCYTES NFR BLD AUTO: 0.2 % (ref 0–0.5)
KETONES UR QL STRIP: NEGATIVE
LEUKOCYTE ESTERASE UR QL STRIP.AUTO: NEGATIVE
LYMPHOCYTES # BLD AUTO: 1.62 10*3/MM3 (ref 0.7–3.1)
LYMPHOCYTES NFR BLD AUTO: 27.5 % (ref 19.6–45.3)
MCH RBC QN AUTO: 29.8 PG (ref 26.6–33)
MCHC RBC AUTO-ENTMCNC: 32.8 G/DL (ref 31.5–35.7)
MCV RBC AUTO: 90.8 FL (ref 79–97)
MONOCYTES # BLD AUTO: 0.48 10*3/MM3 (ref 0.1–0.9)
MONOCYTES NFR BLD AUTO: 8.1 % (ref 5–12)
NEUTROPHILS NFR BLD AUTO: 3.64 10*3/MM3 (ref 1.7–7)
NEUTROPHILS NFR BLD AUTO: 61.7 % (ref 42.7–76)
NITRITE UR QL STRIP: NEGATIVE
NRBC BLD AUTO-RTO: 0 /100 WBC (ref 0–0.2)
PH UR STRIP.AUTO: <=5 [PH] (ref 5–8)
PLATELET # BLD AUTO: 194 10*3/MM3 (ref 140–450)
PMV BLD AUTO: 10.5 FL (ref 6–12)
POTASSIUM SERPL-SCNC: 4.6 MMOL/L (ref 3.5–5.2)
PROT SERPL-MCNC: 6.7 G/DL (ref 6–8.5)
PROT UR QL STRIP: NEGATIVE
QT INTERVAL: 371 MS
RBC # BLD AUTO: 4.33 10*6/MM3 (ref 3.77–5.28)
SODIUM SERPL-SCNC: 135 MMOL/L (ref 136–145)
SP GR UR STRIP: >=1.03 (ref 1–1.03)
TROPONIN T SERPL-MCNC: <0.01 NG/ML (ref 0–0.03)
UROBILINOGEN UR QL STRIP: ABNORMAL
WBC NRBC COR # BLD: 5.9 10*3/MM3 (ref 3.4–10.8)

## 2022-12-22 PROCEDURE — 85025 COMPLETE CBC W/AUTO DIFF WBC: CPT | Performed by: EMERGENCY MEDICINE

## 2022-12-22 PROCEDURE — 93010 ELECTROCARDIOGRAM REPORT: CPT | Performed by: STUDENT IN AN ORGANIZED HEALTH CARE EDUCATION/TRAINING PROGRAM

## 2022-12-22 PROCEDURE — 84484 ASSAY OF TROPONIN QUANT: CPT | Performed by: EMERGENCY MEDICINE

## 2022-12-22 PROCEDURE — 93005 ELECTROCARDIOGRAM TRACING: CPT | Performed by: EMERGENCY MEDICINE

## 2022-12-22 PROCEDURE — 82962 GLUCOSE BLOOD TEST: CPT

## 2022-12-22 PROCEDURE — 81003 URINALYSIS AUTO W/O SCOPE: CPT | Performed by: EMERGENCY MEDICINE

## 2022-12-22 PROCEDURE — 80053 COMPREHEN METABOLIC PANEL: CPT | Performed by: EMERGENCY MEDICINE

## 2022-12-22 PROCEDURE — 36415 COLL VENOUS BLD VENIPUNCTURE: CPT

## 2022-12-22 PROCEDURE — 99283 EMERGENCY DEPT VISIT LOW MDM: CPT

## 2022-12-22 RX ORDER — SODIUM CHLORIDE 0.9 % (FLUSH) 0.9 %
10 SYRINGE (ML) INJECTION AS NEEDED
Status: DISCONTINUED | OUTPATIENT
Start: 2022-12-22 | End: 2022-12-22 | Stop reason: HOSPADM

## 2022-12-22 RX ADMIN — SODIUM CHLORIDE, POTASSIUM CHLORIDE, SODIUM LACTATE AND CALCIUM CHLORIDE 1000 ML: 600; 310; 30; 20 INJECTION, SOLUTION INTRAVENOUS at 11:26

## 2022-12-22 NOTE — ED PROVIDER NOTES
" EMERGENCY DEPARTMENT ENCOUNTER    Room Number:  36/36  Date seen:  12/22/2022  PCP: Yelena Guzman APRN  Historian: Patient      HPI:  Chief Complaint: Shakiness  A complete HPI/ROS/PMH/PSH/SH/FH are unobtainable due to: Nothing  Context: Seble Delgadillo is a 52 y.o. female, with a history of type 2 diabetes, who presents to the ED c/o shakiness.  Patient works here in the hospital.  She was walking down the luis around 8:30 AM today when her knees \"buckled\".  A coworker caught her and kept her from falling.  Patient then began to feel shaky.  She felt anxious and slightly short of breath.  She felt like her heart was racing.  Patient checked her blood sugar and it was 300.  Denies chest pain, dizziness, syncope, headache, abdominal pain, nausea, vomiting, diarrhea, dysuria, or numbness/tingling/weakness in her extremities.  Patient does not check her blood sugar regularly.  She ate shredded wheat and drank a glass of milk around 5:30 AM.  She is not on insulin.  Patient feels better now.  Nothing made her symptoms better or worse.            PAST MEDICAL HISTORY  Active Ambulatory Problems     Diagnosis Date Noted   • Essential hypertension 07/23/2020   • Type 2 diabetes mellitus without complication, without long-term current use of insulin (HCC) 07/23/2020   • Depression 07/23/2020   • Hyperlipidemia 07/23/2020   • Osteoporosis 07/23/2020   • Breast cancer screening 07/23/2020   • Plantar fasciitis 07/23/2020   • Gastroesophageal reflux disease without esophagitis 11/03/2020   • Environmental allergies 11/03/2020   • Insomnia 11/03/2020     Resolved Ambulatory Problems     Diagnosis Date Noted   • No Resolved Ambulatory Problems     Past Medical History:   Diagnosis Date   • Diabetes mellitus (HCC)          PAST SURGICAL HISTORY  Past Surgical History:   Procedure Laterality Date   • HYSTERECTOMY  2002   • TONSILLECTOMY  2010         FAMILY HISTORY  Family History   Problem Relation Age of Onset   • Other " Mother         non hodgkins lymphoma    • Diabetes Mother    • Diabetes Father    • Other Sister         prediabetes   • Diabetes Maternal Grandmother    • Diabetes Paternal Grandmother    • Prostate cancer Paternal Grandfather          SOCIAL HISTORY  Social History     Socioeconomic History   • Marital status:    Tobacco Use   • Smoking status: Never   • Smokeless tobacco: Never   Substance and Sexual Activity   • Alcohol use: Yes     Comment: rarely   • Drug use: No         ALLERGIES  Bupropion, Oxycodone-acetaminophen, and Buspirone        REVIEW OF SYSTEMS  Review of Systems     All systems have been reviewed and are negative except as as discussed in the HPI    PHYSICAL EXAM  ED Triage Vitals   Temp Heart Rate Resp BP SpO2   12/22/22 1011 12/22/22 1011 12/22/22 1011 12/22/22 1020 12/22/22 1011   97.1 °F (36.2 °C) 93 18 134/81 98 %      Temp src Heart Rate Source Patient Position BP Location FiO2 (%)   12/22/22 1011 12/22/22 1020 12/22/22 1020 12/22/22 1020 --   Tympanic Monitor Lying Right arm        Physical Exam      GENERAL: Awake, alert, oriented x3.  Well-developed, well-nourished female.  Resting comfortably in no acute distress  HENT: nares patent, moist mucous membranes  EYES: no scleral icterus, EOMI  CV: regular rhythm, normal rate  RESPIRATORY: normal effort, clear to auscultation bilaterally  ABDOMEN: soft, nontender  MUSCULOSKELETAL: Extremities are nontender with full range of motion.  No pedal edema.  No calf tenderness  NEURO: Speech is clear and fluent.  Normal strength and light touch sensation all extremities  PSYCH:  calm, cooperative  SKIN: warm, dry    Vital signs and nursing notes reviewed.          LAB RESULTS  Recent Results (from the past 24 hour(s))   POC Glucose Once    Collection Time: 12/22/22 10:16 AM    Specimen: Blood   Result Value Ref Range    Glucose 249 (H) 70 - 130 mg/dL   Urinalysis With Microscopic If Indicated (No Culture) - Urine, Clean Catch    Collection Time:  12/22/22 11:22 AM    Specimen: Urine, Clean Catch   Result Value Ref Range    Color, UA Yellow Yellow, Straw    Appearance, UA Clear Clear    pH, UA <=5.0 5.0 - 8.0    Specific Gravity, UA >=1.030 1.005 - 1.030    Glucose, UA >=1000 mg/dL (3+) (A) Negative    Ketones, UA Negative Negative    Bilirubin, UA Negative Negative    Blood, UA Negative Negative    Protein, UA Negative Negative    Leuk Esterase, UA Negative Negative    Nitrite, UA Negative Negative    Urobilinogen, UA 0.2 E.U./dL 0.2 - 1.0 E.U./dL   CBC Auto Differential    Collection Time: 12/22/22 11:24 AM    Specimen: Blood   Result Value Ref Range    WBC 5.90 3.40 - 10.80 10*3/mm3    RBC 4.33 3.77 - 5.28 10*6/mm3    Hemoglobin 12.9 12.0 - 15.9 g/dL    Hematocrit 39.3 34.0 - 46.6 %    MCV 90.8 79.0 - 97.0 fL    MCH 29.8 26.6 - 33.0 pg    MCHC 32.8 31.5 - 35.7 g/dL    RDW 13.6 12.3 - 15.4 %    RDW-SD 46.1 37.0 - 54.0 fl    MPV 10.5 6.0 - 12.0 fL    Platelets 194 140 - 450 10*3/mm3    Neutrophil % 61.7 42.7 - 76.0 %    Lymphocyte % 27.5 19.6 - 45.3 %    Monocyte % 8.1 5.0 - 12.0 %    Eosinophil % 2.0 0.3 - 6.2 %    Basophil % 0.5 0.0 - 1.5 %    Immature Grans % 0.2 0.0 - 0.5 %    Neutrophils, Absolute 3.64 1.70 - 7.00 10*3/mm3    Lymphocytes, Absolute 1.62 0.70 - 3.10 10*3/mm3    Monocytes, Absolute 0.48 0.10 - 0.90 10*3/mm3    Eosinophils, Absolute 0.12 0.00 - 0.40 10*3/mm3    Basophils, Absolute 0.03 0.00 - 0.20 10*3/mm3    Immature Grans, Absolute 0.01 0.00 - 0.05 10*3/mm3    nRBC 0.0 0.0 - 0.2 /100 WBC   ECG 12 Lead Other; Weakness    Collection Time: 12/22/22 11:26 AM   Result Value Ref Range    QT Interval 371 ms   Comprehensive Metabolic Panel    Collection Time: 12/22/22 12:46 PM    Specimen: Blood   Result Value Ref Range    Glucose 190 (H) 65 - 99 mg/dL    BUN 14 6 - 20 mg/dL    Creatinine 0.66 0.57 - 1.00 mg/dL    Sodium 135 (L) 136 - 145 mmol/L    Potassium 4.6 3.5 - 5.2 mmol/L    Chloride 103 98 - 107 mmol/L    CO2 22.1 22.0 - 29.0 mmol/L     Calcium 8.6 8.6 - 10.5 mg/dL    Total Protein 6.7 6.0 - 8.5 g/dL    Albumin 3.60 3.50 - 5.20 g/dL    ALT (SGPT) 35 (H) 1 - 33 U/L    AST (SGOT) 43 (H) 1 - 32 U/L    Alkaline Phosphatase 86 39 - 117 U/L    Total Bilirubin 0.5 0.0 - 1.2 mg/dL    Globulin 3.1 gm/dL    A/G Ratio 1.2 g/dL    BUN/Creatinine Ratio 21.2 7.0 - 25.0    Anion Gap 9.9 5.0 - 15.0 mmol/L    eGFR 104.4 >60.0 mL/min/1.73   Troponin    Collection Time: 12/22/22 12:46 PM    Specimen: Blood   Result Value Ref Range    Troponin T <0.010 0.000 - 0.030 ng/mL       Ordered the above labs and reviewed the results.        RADIOLOGY  No Radiology Exams Resulted Within Past 24 Hours    Ordered the above noted radiological studies. Reviewed by me in PACS.            PROCEDURES  Procedures            MEDICATIONS GIVEN IN ER  Medications   sodium chloride 0.9 % flush 10 mL (has no administration in time range)   lactated ringers bolus 1,000 mL (0 mL Intravenous Stopped 12/22/22 1156)                   MEDICAL DECISION MAKING, PROGRESS, and CONSULTS    All labs have been independently reviewed by me.  All radiology studies have been reviewed by me and I have also reviewed the radiology report.   EKG's independently viewed and interpreted by me.  Discussion below represents my analysis of pertinent findings related to patient's condition, differential diagnosis, treatment plan and final disposition.      Additional sources:  - Discussed/ obtained information from independent historians:     - External (non-ED) record review: Patient saw her PCP in July 2022 for follow-up for type 2 diabetes, hyperlipidemia, GERD, anxiety, hypertension, and osteoporosis.  Hemoglobin A1c was 14.2 in February 2022    - Chronic or social conditions impacting care: None          Orders placed during this visit:  Orders Placed This Encounter   Procedures   • Comprehensive Metabolic Panel   • Urinalysis With Microscopic If Indicated (No Culture) - Urine, Clean Catch   • Troponin    • CBC Auto Differential   • POC Glucose Once   • POC Glucose Once   • ECG 12 Lead Other; Weakness   • Insert Peripheral IV   • CBC & Differential         Additional orders considered but not ordered:  None        Differential diagnosis:    Electrolyte abnormality, dehydration, vasovagal episode, UTI, acute coronary syndrome, arrhythmia      Independent interpretation of labs, radiology studies, and discussions with consultants:  ED Course as of 12/22/22 1618   Thu Dec 22, 2022   1055 Patient presents to the ED after having an episode of shakiness and palpitations.  Vital signs are normal.  Glucose is currently 249.  We will obtain labs and EKG for further evaluation. []   1147 EKG          EKG time: 11:26 AM  Rhythm/Rate: Sinus rhythm, rate 85  P waves and MO: Normal  QRS, axis: Normal  ST and T waves: Nonspecific ST/T wave changes in the inferior leads, no ST elevation or depression    Interpreted Contemporaneously by me at 11:30 AM, independently viewed  No prior available for comparison    []   1438 Test results discussed with the patient.  She is feeling better.  She was able ambulate to the bathroom without difficulty.  Patient was advised to check her blood sugar regularly and to follow-up with her PCP.  Return precautions were discussed []      ED Course User Index  [] Pio Sinha MD             DIAGNOSIS  Final diagnoses:   Type 2 diabetes mellitus with hyperglycemia, without long-term current use of insulin (HCC)   Bilateral leg weakness   Poorly controlled diabetes mellitus (HCC)         DISPOSITION  DISCHARGE    Patient discharged in stable condition.    Reviewed implications of results, diagnosis, meds, responsibility to follow up, warning signs and symptoms of possible worsening, potential complications and reasons to return to ER, including worsening or recurrent symptoms, chest pain, dizziness, fainting, or other concern..    Patient/Family voiced understanding of above  instructions.    Discussed plan for discharge, as there is no emergent indication for admission. Patient referred to primary care provider for BP management due to today's BP. Pt/family is agreeable and understands need for follow up and repeat testing.  Pt is aware that discharge does not mean that nothing is wrong but it indicates no emergency is present that requires admission and they must continue care with follow-up as given below or physician of their choice.     FOLLOW-UP  Yelena Guzman, APRN  [address]  [address]  [place] [address]  [phone]    Schedule an appointment as soon as possible for a visit            Medication List      No changes were made to your prescriptions during this visit.                   Latest Documented Vital Signs:  As of 16:18 EST  BP- 126/68 HR- 79 Temp- 97.1 °F (36.2 °C) (Tympanic) O2 sat- 92%              --    Please note that portions of this were completed with a voice recognition program.       Note Disclaimer: At [facility], we believe that sharing information builds trust and better relationships. You are receiving this note because you are receiving care at [facility] or recently visited. It is possible you will see health information before a provider has talked with you about it. This kind of information can be easy to misunderstand. To help you fully understand what it means for your health, we urge you to discuss this note with your provider.           Pio Sinha MD  12/22/22 1216

## 2022-12-22 NOTE — DISCHARGE INSTRUCTIONS
Check your blood sugar regularly.  Follow-up with the primary care provider.  Return to the emergency department for worsening or persistent symptoms.

## 2022-12-22 NOTE — ED TRIAGE NOTES
"Pt via wheelchair from floor with c/o shakiness, fatigue, and an episode of a near fall (pt reports \"my knees buckled and I almost fell to the floor\"). Checked her glucose and it was 300.    All triage performed with this RN wearing appropriate PPE.  Pt placed in mask upon arrival to ED.    "

## 2023-09-20 NOTE — TELEPHONE ENCOUNTER
Signature required for closure    Detail Level: Detailed Depth Of Biopsy: dermis Was A Bandage Applied: Yes Size Of Lesion In Cm: 0 Biopsy Type: H and E Biopsy Method: double edge Personna blade Anesthesia Type: 1% lidocaine with epinephrine Anesthesia Volume In Cc (Will Not Render If 0): 0.5 Hemostasis: Soren's Wound Care: Polysporin ointment Dressing: bandage Destruction After The Procedure: No Type Of Destruction Used: Curettage Curettage Text: The wound bed was treated with curettage after the biopsy was performed. Cryotherapy Text: The wound bed was treated with cryotherapy after the biopsy was performed. Electrodesiccation Text: The wound bed was treated with electrodesiccation after the biopsy was performed. Electrodesiccation And Curettage Text: The wound bed was treated with electrodesiccation and curettage after the biopsy was performed. Silver Nitrate Text: The wound bed was treated with silver nitrate after the biopsy was performed. Lab: 6 Lab Facility: 3 Consent: Written consent was obtained and risks were reviewed including but not limited to scarring, infection, bleeding, scabbing, incomplete removal, nerve damage and allergy to anesthesia. Post-Care Instructions: I reviewed with the patient in detail post-care instructions. Patient is to keep the biopsy site dry overnight, and then apply polysporin twice daily until healed. Notification Instructions: Patient will be notified of biopsy results. However, patient instructed to call the office if not contacted within 2 weeks. Billing Type: Third-Party Bill Information: Selecting Yes will display possible errors in your note based on the variables you have selected. This validation is only offered as a suggestion for you. PLEASE NOTE THAT THE VALIDATION TEXT WILL BE REMOVED WHEN YOU FINALIZE YOUR NOTE. IF YOU WANT TO FAX A PRELIMINARY NOTE YOU WILL NEED TO TOGGLE THIS TO 'NO' IF YOU DO NOT WANT IT IN YOUR FAXED NOTE.

## 2024-08-12 ENCOUNTER — HOSPITAL ENCOUNTER (OUTPATIENT)
Facility: HOSPITAL | Age: 54
Setting detail: OBSERVATION
Discharge: HOME | End: 2024-08-15
Attending: EMERGENCY MEDICINE | Admitting: INTERNAL MEDICINE
Payer: COMMERCIAL

## 2024-08-12 ENCOUNTER — APPOINTMENT (OUTPATIENT)
Dept: CT IMAGING | Facility: HOSPITAL | Age: 54
End: 2024-08-12
Payer: COMMERCIAL

## 2024-08-12 DIAGNOSIS — F32.A DEPRESSION, UNSPECIFIED DEPRESSION TYPE: ICD-10-CM

## 2024-08-12 DIAGNOSIS — K52.9 COLITIS: Primary | ICD-10-CM

## 2024-08-12 DIAGNOSIS — R73.9 HYPERGLYCEMIA: ICD-10-CM

## 2024-08-12 DIAGNOSIS — R10.30 LOWER ABDOMINAL PAIN: ICD-10-CM

## 2024-08-12 DIAGNOSIS — E87.20 LACTIC ACIDOSIS: ICD-10-CM

## 2024-08-12 LAB
ALBUMIN SERPL-MCNC: 4.1 G/DL (ref 3.5–5.2)
ALBUMIN/GLOB SERPL: 1.3 G/DL
ALP SERPL-CCNC: 86 U/L (ref 39–117)
ALT SERPL W P-5'-P-CCNC: 17 U/L (ref 1–33)
ANION GAP SERPL CALCULATED.3IONS-SCNC: 14 MMOL/L (ref 5–15)
AST SERPL-CCNC: 15 U/L (ref 1–32)
BACTERIA UR QL AUTO: ABNORMAL /HPF
BASOPHILS # BLD AUTO: 0.02 10*3/MM3 (ref 0–0.2)
BASOPHILS NFR BLD AUTO: 0.3 % (ref 0–1.5)
BILIRUB SERPL-MCNC: 0.5 MG/DL (ref 0–1.2)
BILIRUB UR QL STRIP: NEGATIVE
BUN SERPL-MCNC: 9 MG/DL (ref 6–20)
BUN/CREAT SERPL: 11 (ref 7–25)
CALCIUM SPEC-SCNC: 9.9 MG/DL (ref 8.6–10.5)
CHLORIDE SERPL-SCNC: 99 MMOL/L (ref 98–107)
CLARITY UR: CLEAR
CO2 SERPL-SCNC: 21 MMOL/L (ref 22–29)
COLOR UR: YELLOW
CREAT SERPL-MCNC: 0.82 MG/DL (ref 0.57–1)
D-LACTATE SERPL-SCNC: 1.7 MMOL/L (ref 0.5–2)
D-LACTATE SERPL-SCNC: 2.4 MMOL/L (ref 0.5–2)
DEPRECATED RDW RBC AUTO: 43.4 FL (ref 37–54)
EGFRCR SERPLBLD CKD-EPI 2021: 84.1 ML/MIN/1.73
EOSINOPHIL # BLD AUTO: 0.09 10*3/MM3 (ref 0–0.4)
EOSINOPHIL NFR BLD AUTO: 1.3 % (ref 0.3–6.2)
ERYTHROCYTE [DISTWIDTH] IN BLOOD BY AUTOMATED COUNT: 12.5 % (ref 12.3–15.4)
GLOBULIN UR ELPH-MCNC: 3.2 GM/DL
GLUCOSE BLDC GLUCOMTR-MCNC: 220 MG/DL (ref 70–130)
GLUCOSE BLDC GLUCOMTR-MCNC: 285 MG/DL (ref 70–130)
GLUCOSE SERPL-MCNC: 392 MG/DL (ref 65–99)
GLUCOSE UR STRIP-MCNC: ABNORMAL MG/DL
HCT VFR BLD AUTO: 44.2 % (ref 34–46.6)
HGB BLD-MCNC: 14.3 G/DL (ref 12–15.9)
HGB UR QL STRIP.AUTO: ABNORMAL
HOLD SPECIMEN: NORMAL
HOLD SPECIMEN: NORMAL
HYALINE CASTS UR QL AUTO: ABNORMAL /LPF
IMM GRANULOCYTES # BLD AUTO: 0.01 10*3/MM3 (ref 0–0.05)
IMM GRANULOCYTES NFR BLD AUTO: 0.1 % (ref 0–0.5)
KETONES UR QL STRIP: ABNORMAL
LEUKOCYTE ESTERASE UR QL STRIP.AUTO: NEGATIVE
LIPASE SERPL-CCNC: 26 U/L (ref 13–60)
LYMPHOCYTES # BLD AUTO: 1.45 10*3/MM3 (ref 0.7–3.1)
LYMPHOCYTES NFR BLD AUTO: 21.4 % (ref 19.6–45.3)
MCH RBC QN AUTO: 30.4 PG (ref 26.6–33)
MCHC RBC AUTO-ENTMCNC: 32.4 G/DL (ref 31.5–35.7)
MCV RBC AUTO: 94 FL (ref 79–97)
MONOCYTES # BLD AUTO: 0.49 10*3/MM3 (ref 0.1–0.9)
MONOCYTES NFR BLD AUTO: 7.2 % (ref 5–12)
NEUTROPHILS NFR BLD AUTO: 4.73 10*3/MM3 (ref 1.7–7)
NEUTROPHILS NFR BLD AUTO: 69.7 % (ref 42.7–76)
NITRITE UR QL STRIP: POSITIVE
NRBC BLD AUTO-RTO: 0 /100 WBC (ref 0–0.2)
PH UR STRIP.AUTO: 5.5 [PH] (ref 5–8)
PLATELET # BLD AUTO: 168 10*3/MM3 (ref 140–450)
PMV BLD AUTO: 9.2 FL (ref 6–12)
POTASSIUM SERPL-SCNC: 3.9 MMOL/L (ref 3.5–5.2)
PROCALCITONIN SERPL-MCNC: 0.06 NG/ML (ref 0–0.25)
PROT SERPL-MCNC: 7.3 G/DL (ref 6–8.5)
PROT UR QL STRIP: ABNORMAL
RBC # BLD AUTO: 4.7 10*6/MM3 (ref 3.77–5.28)
RBC # UR STRIP: ABNORMAL /HPF
REF LAB TEST METHOD: ABNORMAL
SODIUM SERPL-SCNC: 134 MMOL/L (ref 136–145)
SP GR UR STRIP: >1.03 (ref 1–1.03)
SQUAMOUS #/AREA URNS HPF: ABNORMAL /HPF
UROBILINOGEN UR QL STRIP: ABNORMAL
WBC # UR STRIP: ABNORMAL /HPF
WBC NRBC COR # BLD AUTO: 6.79 10*3/MM3 (ref 3.4–10.8)
WHOLE BLOOD HOLD COAG: NORMAL
WHOLE BLOOD HOLD SPECIMEN: NORMAL

## 2024-08-12 PROCEDURE — 25010000002 LEVOFLOXACIN PER 250 MG: Performed by: EMERGENCY MEDICINE

## 2024-08-12 PROCEDURE — 25510000001 IOPAMIDOL 61 % SOLUTION: Performed by: EMERGENCY MEDICINE

## 2024-08-12 PROCEDURE — 96375 TX/PRO/DX INJ NEW DRUG ADDON: CPT

## 2024-08-12 PROCEDURE — 36415 COLL VENOUS BLD VENIPUNCTURE: CPT

## 2024-08-12 PROCEDURE — 25810000003 LACTATED RINGERS SOLUTION: Performed by: EMERGENCY MEDICINE

## 2024-08-12 PROCEDURE — 87040 BLOOD CULTURE FOR BACTERIA: CPT | Performed by: EMERGENCY MEDICINE

## 2024-08-12 PROCEDURE — 74177 CT ABD & PELVIS W/CONTRAST: CPT

## 2024-08-12 PROCEDURE — 80053 COMPREHEN METABOLIC PANEL: CPT

## 2024-08-12 PROCEDURE — 25010000002 METRONIDAZOLE 500 MG/100ML SOLUTION: Performed by: EMERGENCY MEDICINE

## 2024-08-12 PROCEDURE — 96361 HYDRATE IV INFUSION ADD-ON: CPT

## 2024-08-12 PROCEDURE — 25010000002 HYDROMORPHONE PER 4 MG: Performed by: EMERGENCY MEDICINE

## 2024-08-12 PROCEDURE — 96367 TX/PROPH/DG ADDL SEQ IV INF: CPT

## 2024-08-12 PROCEDURE — 96365 THER/PROPH/DIAG IV INF INIT: CPT

## 2024-08-12 PROCEDURE — G0378 HOSPITAL OBSERVATION PER HR: HCPCS

## 2024-08-12 PROCEDURE — 96376 TX/PRO/DX INJ SAME DRUG ADON: CPT

## 2024-08-12 PROCEDURE — 25010000002 HYDROMORPHONE PER 4 MG: Performed by: INTERNAL MEDICINE

## 2024-08-12 PROCEDURE — 99285 EMERGENCY DEPT VISIT HI MDM: CPT

## 2024-08-12 PROCEDURE — 83690 ASSAY OF LIPASE: CPT

## 2024-08-12 PROCEDURE — 25810000003 LACTATED RINGERS PER 1000 ML: Performed by: EMERGENCY MEDICINE

## 2024-08-12 PROCEDURE — 87493 C DIFF AMPLIFIED PROBE: CPT | Performed by: EMERGENCY MEDICINE

## 2024-08-12 PROCEDURE — 82948 REAGENT STRIP/BLOOD GLUCOSE: CPT

## 2024-08-12 PROCEDURE — 63710000001 INSULIN GLARGINE PER 5 UNITS: Performed by: INTERNAL MEDICINE

## 2024-08-12 PROCEDURE — 83605 ASSAY OF LACTIC ACID: CPT

## 2024-08-12 PROCEDURE — 85025 COMPLETE CBC W/AUTO DIFF WBC: CPT

## 2024-08-12 PROCEDURE — 63710000001 INSULIN REGULAR HUMAN PER 5 UNITS: Performed by: EMERGENCY MEDICINE

## 2024-08-12 PROCEDURE — 84145 PROCALCITONIN (PCT): CPT | Performed by: EMERGENCY MEDICINE

## 2024-08-12 PROCEDURE — 83605 ASSAY OF LACTIC ACID: CPT | Performed by: EMERGENCY MEDICINE

## 2024-08-12 PROCEDURE — 25010000002 ONDANSETRON PER 1 MG: Performed by: EMERGENCY MEDICINE

## 2024-08-12 PROCEDURE — 87507 IADNA-DNA/RNA PROBE TQ 12-25: CPT | Performed by: EMERGENCY MEDICINE

## 2024-08-12 PROCEDURE — 81001 URINALYSIS AUTO W/SCOPE: CPT

## 2024-08-12 RX ORDER — NICOTINE POLACRILEX 4 MG
15 LOZENGE BUCCAL
Status: DISCONTINUED | OUTPATIENT
Start: 2024-08-12 | End: 2024-08-15 | Stop reason: HOSPADM

## 2024-08-12 RX ORDER — ONDANSETRON 4 MG/1
4 TABLET, ORALLY DISINTEGRATING ORAL EVERY 6 HOURS PRN
Status: DISCONTINUED | OUTPATIENT
Start: 2024-08-12 | End: 2024-08-15 | Stop reason: HOSPADM

## 2024-08-12 RX ORDER — ONDANSETRON 2 MG/ML
4 INJECTION INTRAMUSCULAR; INTRAVENOUS ONCE
Status: COMPLETED | OUTPATIENT
Start: 2024-08-12 | End: 2024-08-12

## 2024-08-12 RX ORDER — IBUPROFEN 600 MG/1
1 TABLET ORAL
Status: DISCONTINUED | OUTPATIENT
Start: 2024-08-12 | End: 2024-08-15 | Stop reason: HOSPADM

## 2024-08-12 RX ORDER — METRONIDAZOLE 500 MG/100ML
500 INJECTION, SOLUTION INTRAVENOUS ONCE
Status: COMPLETED | OUTPATIENT
Start: 2024-08-12 | End: 2024-08-12

## 2024-08-12 RX ORDER — CHOLECALCIFEROL (VITAMIN D3) 25 MCG
2000 TABLET ORAL DAILY
Status: DISCONTINUED | OUTPATIENT
Start: 2024-08-13 | End: 2024-08-15 | Stop reason: HOSPADM

## 2024-08-12 RX ORDER — HYDROMORPHONE HYDROCHLORIDE 1 MG/ML
0.5 INJECTION, SOLUTION INTRAMUSCULAR; INTRAVENOUS; SUBCUTANEOUS EVERY 4 HOURS PRN
Status: DISCONTINUED | OUTPATIENT
Start: 2024-08-12 | End: 2024-08-15 | Stop reason: HOSPADM

## 2024-08-12 RX ORDER — BISACODYL 10 MG
10 SUPPOSITORY, RECTAL RECTAL DAILY PRN
Status: DISCONTINUED | OUTPATIENT
Start: 2024-08-12 | End: 2024-08-15 | Stop reason: HOSPADM

## 2024-08-12 RX ORDER — LEVOFLOXACIN 5 MG/ML
750 INJECTION, SOLUTION INTRAVENOUS EVERY 24 HOURS
Status: DISCONTINUED | OUTPATIENT
Start: 2024-08-13 | End: 2024-08-15 | Stop reason: HOSPADM

## 2024-08-12 RX ORDER — ACETAMINOPHEN 160 MG/5ML
650 SOLUTION ORAL EVERY 4 HOURS PRN
Status: DISCONTINUED | OUTPATIENT
Start: 2024-08-12 | End: 2024-08-15 | Stop reason: HOSPADM

## 2024-08-12 RX ORDER — CITALOPRAM 20 MG/1
20 TABLET ORAL DAILY
Status: DISCONTINUED | OUTPATIENT
Start: 2024-08-13 | End: 2024-08-15 | Stop reason: HOSPADM

## 2024-08-12 RX ORDER — AMOXICILLIN 250 MG
2 CAPSULE ORAL 2 TIMES DAILY PRN
Status: DISCONTINUED | OUTPATIENT
Start: 2024-08-12 | End: 2024-08-15 | Stop reason: HOSPADM

## 2024-08-12 RX ORDER — INSULIN LISPRO 100 [IU]/ML
2-7 INJECTION, SOLUTION INTRAVENOUS; SUBCUTANEOUS
Status: DISCONTINUED | OUTPATIENT
Start: 2024-08-13 | End: 2024-08-15 | Stop reason: HOSPADM

## 2024-08-12 RX ORDER — HYDROCODONE BITARTRATE AND ACETAMINOPHEN 5; 325 MG/1; MG/1
1 TABLET ORAL EVERY 6 HOURS PRN
Status: DISCONTINUED | OUTPATIENT
Start: 2024-08-12 | End: 2024-08-14

## 2024-08-12 RX ORDER — POLYETHYLENE GLYCOL 3350 17 G/17G
17 POWDER, FOR SOLUTION ORAL DAILY PRN
Status: DISCONTINUED | OUTPATIENT
Start: 2024-08-12 | End: 2024-08-15 | Stop reason: HOSPADM

## 2024-08-12 RX ORDER — ACETAMINOPHEN 325 MG/1
650 TABLET ORAL EVERY 4 HOURS PRN
Status: DISCONTINUED | OUTPATIENT
Start: 2024-08-12 | End: 2024-08-15 | Stop reason: HOSPADM

## 2024-08-12 RX ORDER — SODIUM CHLORIDE 0.9 % (FLUSH) 0.9 %
10 SYRINGE (ML) INJECTION AS NEEDED
Status: DISCONTINUED | OUTPATIENT
Start: 2024-08-12 | End: 2024-08-15 | Stop reason: HOSPADM

## 2024-08-12 RX ORDER — PRAVASTATIN SODIUM 40 MG
80 TABLET ORAL DAILY
Status: DISCONTINUED | OUTPATIENT
Start: 2024-08-13 | End: 2024-08-15 | Stop reason: HOSPADM

## 2024-08-12 RX ORDER — METRONIDAZOLE 500 MG/100ML
500 INJECTION, SOLUTION INTRAVENOUS ONCE
Status: COMPLETED | OUTPATIENT
Start: 2024-08-13 | End: 2024-08-13

## 2024-08-12 RX ORDER — BISACODYL 5 MG/1
5 TABLET, DELAYED RELEASE ORAL DAILY PRN
Status: DISCONTINUED | OUTPATIENT
Start: 2024-08-12 | End: 2024-08-15 | Stop reason: HOSPADM

## 2024-08-12 RX ORDER — DEXTROSE MONOHYDRATE 25 G/50ML
25 INJECTION, SOLUTION INTRAVENOUS
Status: DISCONTINUED | OUTPATIENT
Start: 2024-08-12 | End: 2024-08-15 | Stop reason: HOSPADM

## 2024-08-12 RX ORDER — LISINOPRIL 10 MG/1
10 TABLET ORAL
Status: DISCONTINUED | OUTPATIENT
Start: 2024-08-13 | End: 2024-08-15 | Stop reason: HOSPADM

## 2024-08-12 RX ORDER — ONDANSETRON 2 MG/ML
4 INJECTION INTRAMUSCULAR; INTRAVENOUS EVERY 6 HOURS PRN
Status: DISCONTINUED | OUTPATIENT
Start: 2024-08-12 | End: 2024-08-15 | Stop reason: HOSPADM

## 2024-08-12 RX ORDER — HYDROMORPHONE HYDROCHLORIDE 1 MG/ML
0.5 INJECTION, SOLUTION INTRAMUSCULAR; INTRAVENOUS; SUBCUTANEOUS ONCE
Status: COMPLETED | OUTPATIENT
Start: 2024-08-12 | End: 2024-08-12

## 2024-08-12 RX ORDER — LEVOFLOXACIN 5 MG/ML
750 INJECTION, SOLUTION INTRAVENOUS ONCE
Status: COMPLETED | OUTPATIENT
Start: 2024-08-12 | End: 2024-08-12

## 2024-08-12 RX ORDER — SODIUM CHLORIDE, SODIUM LACTATE, POTASSIUM CHLORIDE, CALCIUM CHLORIDE 600; 310; 30; 20 MG/100ML; MG/100ML; MG/100ML; MG/100ML
75 INJECTION, SOLUTION INTRAVENOUS CONTINUOUS
Status: DISCONTINUED | OUTPATIENT
Start: 2024-08-12 | End: 2024-08-15

## 2024-08-12 RX ORDER — ACETAMINOPHEN 650 MG/1
650 SUPPOSITORY RECTAL EVERY 4 HOURS PRN
Status: DISCONTINUED | OUTPATIENT
Start: 2024-08-12 | End: 2024-08-15 | Stop reason: HOSPADM

## 2024-08-12 RX ORDER — DIPHENOXYLATE HYDROCHLORIDE AND ATROPINE SULFATE 2.5; .025 MG/1; MG/1
1 TABLET ORAL DAILY
Status: DISCONTINUED | OUTPATIENT
Start: 2024-08-13 | End: 2024-08-15 | Stop reason: HOSPADM

## 2024-08-12 RX ADMIN — HYDROMORPHONE HYDROCHLORIDE 0.5 MG: 1 INJECTION, SOLUTION INTRAMUSCULAR; INTRAVENOUS; SUBCUTANEOUS at 15:11

## 2024-08-12 RX ADMIN — INSULIN HUMAN 5 UNITS: 100 INJECTION, SOLUTION PARENTERAL at 18:01

## 2024-08-12 RX ADMIN — HYDROMORPHONE HYDROCHLORIDE 0.5 MG: 1 INJECTION, SOLUTION INTRAMUSCULAR; INTRAVENOUS; SUBCUTANEOUS at 16:47

## 2024-08-12 RX ADMIN — ONDANSETRON 4 MG: 2 INJECTION, SOLUTION INTRAMUSCULAR; INTRAVENOUS at 16:47

## 2024-08-12 RX ADMIN — LEVOFLOXACIN 750 MG: 5 INJECTION, SOLUTION INTRAVENOUS at 18:03

## 2024-08-12 RX ADMIN — INSULIN GLARGINE 20 UNITS: 100 INJECTION, SOLUTION SUBCUTANEOUS at 23:11

## 2024-08-12 RX ADMIN — SODIUM CHLORIDE, POTASSIUM CHLORIDE, SODIUM LACTATE AND CALCIUM CHLORIDE 125 ML/HR: 600; 310; 30; 20 INJECTION, SOLUTION INTRAVENOUS at 17:14

## 2024-08-12 RX ADMIN — METRONIDAZOLE 500 MG: 500 INJECTION, SOLUTION INTRAVENOUS at 17:14

## 2024-08-12 RX ADMIN — SODIUM CHLORIDE, POTASSIUM CHLORIDE, SODIUM LACTATE AND CALCIUM CHLORIDE 1000 ML: 600; 310; 30; 20 INJECTION, SOLUTION INTRAVENOUS at 15:15

## 2024-08-12 RX ADMIN — HYDROMORPHONE HYDROCHLORIDE 0.5 MG: 1 INJECTION, SOLUTION INTRAMUSCULAR; INTRAVENOUS; SUBCUTANEOUS at 20:36

## 2024-08-12 RX ADMIN — ONDANSETRON 4 MG: 2 INJECTION, SOLUTION INTRAMUSCULAR; INTRAVENOUS at 15:13

## 2024-08-12 RX ADMIN — IOPAMIDOL 85 ML: 612 INJECTION, SOLUTION INTRAVENOUS at 15:38

## 2024-08-12 NOTE — ED NOTES
Pt has lower abd pain since sunday.  She has had white liquid diarrhea and nausea   She was seen at [facility] this am - sent for further eval

## 2024-08-12 NOTE — ED PROVIDER NOTES
EMERGENCY DEPARTMENT ENCOUNTER    Room Number:  27/27  Date seen:  8/12/2024  PCP: Yelena Guzman APRN  Historian: Patient      HPI:  Chief Complaint: Abdominal pain  Context: Seble Delgadillo is a 54 y.o. female who presents to the ED c/o abdominal pain with nausea and diarrhea for the past several days.  The patient states has become progressively worse.  She reports nausea but no vomiting with no fevers or chills.  She denies a history of of diverticulitis.      PAST MEDICAL HISTORY  Active Ambulatory Problems     Diagnosis Date Noted    Essential hypertension 07/23/2020    Type 2 diabetes mellitus without complication, without long-term current use of insulin 07/23/2020    Depression 07/23/2020    Hyperlipidemia 07/23/2020    Osteoporosis 07/23/2020    Breast cancer screening 07/23/2020    Plantar fasciitis 07/23/2020    Gastroesophageal reflux disease without esophagitis 11/03/2020    Environmental allergies 11/03/2020    Insomnia 11/03/2020     Resolved Ambulatory Problems     Diagnosis Date Noted    No Resolved Ambulatory Problems     Past Medical History:   Diagnosis Date    Diabetes mellitus          REVIEW OF SYSTEMS  All systems reviewed and negative except for those discussed in HPI.       PAST SURGICAL HISTORY  Past Surgical History:   Procedure Laterality Date    HYSTERECTOMY  2002    TONSILLECTOMY  2010         FAMILY HISTORY  Family History   Problem Relation Age of Onset    Other Mother         non hodgkins lymphoma     Diabetes Mother     Diabetes Father     Other Sister         prediabetes    Diabetes Maternal Grandmother     Diabetes Paternal Grandmother     Prostate cancer Paternal Grandfather          SOCIAL HISTORY  Social History     Socioeconomic History    Marital status:    Tobacco Use    Smoking status: Never    Smokeless tobacco: Never   Substance and Sexual Activity    Alcohol use: Yes     Comment: rarely    Drug use: No         ALLERGIES  Bupropion,  Oxycodone-acetaminophen, and Buspirone      PHYSICAL EXAM  ED Triage Vitals   Temp Heart Rate Resp BP SpO2   08/12/24 1323 08/12/24 1323 08/12/24 1323 08/12/24 1343 08/12/24 1323   98.9 °F (37.2 °C) 108 16 133/82 93 %      Temp src Heart Rate Source Patient Position BP Location FiO2 (%)   08/12/24 1323 08/12/24 1323 -- -- --   Tympanic Monitor          Physical Exam      GENERAL: 54-year-old female in mild distress  HENT: NCAT: nares patent: Neck supple  EYES: no scleral icterus  CV: regular rhythm, normal rate  RESPIRATORY: normal effort  ABDOMEN: soft, bilateral lower abdominal tenderness with guarding but no rebound and diminished bowel sounds  MUSCULOSKELETAL: no deformity  NEURO: alert with nonfocal neuro exam  PSYCH:  calm, cooperative  SKIN: warm, dry    Vital signs and nursing notes reviewed.      LAB RESULTS  Recent Results (from the past 24 hour(s))   Comprehensive Metabolic Panel    Collection Time: 08/12/24  1:47 PM    Specimen: Arm, Right; Blood   Result Value Ref Range    Glucose 392 (H) 65 - 99 mg/dL    BUN 9 6 - 20 mg/dL    Creatinine 0.82 0.57 - 1.00 mg/dL    Sodium 134 (L) 136 - 145 mmol/L    Potassium 3.9 3.5 - 5.2 mmol/L    Chloride 99 98 - 107 mmol/L    CO2 21.0 (L) 22.0 - 29.0 mmol/L    Calcium 9.9 8.6 - 10.5 mg/dL    Total Protein 7.3 6.0 - 8.5 g/dL    Albumin 4.1 3.5 - 5.2 g/dL    ALT (SGPT) 17 1 - 33 U/L    AST (SGOT) 15 1 - 32 U/L    Alkaline Phosphatase 86 39 - 117 U/L    Total Bilirubin 0.5 0.0 - 1.2 mg/dL    Globulin 3.2 gm/dL    A/G Ratio 1.3 g/dL    BUN/Creatinine Ratio 11.0 7.0 - 25.0    Anion Gap 14.0 5.0 - 15.0 mmol/L    eGFR 84.1 >60.0 mL/min/1.73   Lipase    Collection Time: 08/12/24  1:47 PM    Specimen: Arm, Right; Blood   Result Value Ref Range    Lipase 26 13 - 60 U/L   Lactic Acid, Plasma    Collection Time: 08/12/24  1:47 PM    Specimen: Arm, Right; Blood   Result Value Ref Range    Lactate 2.4 (C) 0.5 - 2.0 mmol/L   Green Top (Gel)    Collection Time: 08/12/24  1:47 PM    Result Value Ref Range    Extra Tube Hold for add-ons.    Lavender Top    Collection Time: 08/12/24  1:47 PM   Result Value Ref Range    Extra Tube hold for add-on    Gold Top - SST    Collection Time: 08/12/24  1:47 PM   Result Value Ref Range    Extra Tube Hold for add-ons.    Light Blue Top    Collection Time: 08/12/24  1:47 PM   Result Value Ref Range    Extra Tube Hold for add-ons.    CBC Auto Differential    Collection Time: 08/12/24  1:47 PM    Specimen: Arm, Right; Blood   Result Value Ref Range    WBC 6.79 3.40 - 10.80 10*3/mm3    RBC 4.70 3.77 - 5.28 10*6/mm3    Hemoglobin 14.3 12.0 - 15.9 g/dL    Hematocrit 44.2 34.0 - 46.6 %    MCV 94.0 79.0 - 97.0 fL    MCH 30.4 26.6 - 33.0 pg    MCHC 32.4 31.5 - 35.7 g/dL    RDW 12.5 12.3 - 15.4 %    RDW-SD 43.4 37.0 - 54.0 fl    MPV 9.2 6.0 - 12.0 fL    Platelets 168 140 - 450 10*3/mm3    Neutrophil % 69.7 42.7 - 76.0 %    Lymphocyte % 21.4 19.6 - 45.3 %    Monocyte % 7.2 5.0 - 12.0 %    Eosinophil % 1.3 0.3 - 6.2 %    Basophil % 0.3 0.0 - 1.5 %    Immature Grans % 0.1 0.0 - 0.5 %    Neutrophils, Absolute 4.73 1.70 - 7.00 10*3/mm3    Lymphocytes, Absolute 1.45 0.70 - 3.10 10*3/mm3    Monocytes, Absolute 0.49 0.10 - 0.90 10*3/mm3    Eosinophils, Absolute 0.09 0.00 - 0.40 10*3/mm3    Basophils, Absolute 0.02 0.00 - 0.20 10*3/mm3    Immature Grans, Absolute 0.01 0.00 - 0.05 10*3/mm3    nRBC 0.0 0.0 - 0.2 /100 WBC   Procalcitonin    Collection Time: 08/12/24  1:47 PM    Specimen: Arm, Right; Blood   Result Value Ref Range    Procalcitonin 0.06 0.00 - 0.25 ng/mL   Urinalysis With Microscopic If Indicated (No Culture) - Urine, Clean Catch    Collection Time: 08/12/24  1:52 PM    Specimen: Urine, Clean Catch   Result Value Ref Range    Color, UA Yellow Yellow, Straw    Appearance, UA Clear Clear    pH, UA 5.5 5.0 - 8.0    Specific Gravity, UA >1.030 (H) 1.005 - 1.030    Glucose, UA >=1000 mg/dL (3+) (A) Negative    Ketones, UA 40 mg/dL (2+) (A) Negative    Bilirubin, UA  Negative Negative    Blood, UA Trace (A) Negative    Protein, UA 30 mg/dL (1+) (A) Negative    Leuk Esterase, UA Negative Negative    Nitrite, UA Positive (A) Negative    Urobilinogen, UA 0.2 E.U./dL 0.2 - 1.0 E.U./dL   Urinalysis, Microscopic Only - Urine, Clean Catch    Collection Time: 08/12/24  1:52 PM    Specimen: Urine, Clean Catch   Result Value Ref Range    RBC, UA 0-2 None Seen, 0-2 /HPF    WBC, UA 11-20 (A) None Seen, 0-2 /HPF    Bacteria, UA 3+ (A) None Seen /HPF    Squamous Epithelial Cells, UA 3-6 (A) None Seen, 0-2 /HPF    Hyaline Casts, UA None Seen None Seen /LPF    Methodology Automated Microscopy    STAT Lactic Acid, Reflex    Collection Time: 08/12/24  5:01 PM    Specimen: Blood   Result Value Ref Range    Lactate 1.7 0.5 - 2.0 mmol/L   POC Glucose Once    Collection Time: 08/12/24  5:52 PM    Specimen: Blood   Result Value Ref Range    Glucose 285 (H) 70 - 130 mg/dL       Ordered the above labs and reviewed the results.        RADIOLOGY  CT Abdomen Pelvis With Contrast    Result Date: 8/12/2024  CT ABDOMEN AND PELVIS WITH IV CONTRAST  HISTORY: 54-year-old female with lower abdominal pain. Hysterectomy in the past.  TECHNIQUE: Radiation dose reduction techniques were utilized, including automated exposure control and exposure modulation based on body size. 3 mm images were obtained through the abdomen and pelvis after the administration of IV contrast. No previous CT for comparison.  FINDINGS: 1. There is extensive colitis involving a long segment of sigmoid colon and rectum. Extensive thickening and surrounding inflammatory change/edema. The appearance can be seen with pseudomembranous colitis and an extensive infectious colitis. There is very small volume of free fluid, but there is no fluid collection or abscess. The colon appears otherwise unremarkable. There is secondary mild thickening of an adjacent segment of small bowel, series 2 image 131. There is no appendicitis. No bowel ileus or  obstruction.  2. There is cholelithiasis without cholecystitis and there is no biliary dilatation. The liver, pancreas, spleen, adrenals, and kidneys appear unremarkable. Uterus and ovaries are surgically absent. Urinary bladder is significantly thick walled, but is partially collapsed. Please correlate clinically for acute UTI/cystitis.  3. At the visualized lower chest, there are very ill-defined patchy groundglass opacities at the right lung base, and very faint groundglass density at the left lung base. Pulmonary vasculature is prominent. This may possibly represent mild edema or mild atypical pneumonia.       Ordered the above noted radiological studies. Reviewed by me in PACS.            PROCEDURES  Procedures          MEDICATIONS GIVEN IN ER  Medications   sodium chloride 0.9 % flush 10 mL (has no administration in time range)   levoFLOXacin (LEVAQUIN) 750 mg/150 mL D5W (premix) (LEVAQUIN) 750 mg (has no administration in time range)   lactated ringers infusion (125 mL/hr Intravenous New Bag 8/12/24 1714)   acetaminophen (TYLENOL) tablet 650 mg (has no administration in time range)     Or   acetaminophen (TYLENOL) 160 MG/5ML oral solution 650 mg (has no administration in time range)     Or   acetaminophen (TYLENOL) suppository 650 mg (has no administration in time range)   ondansetron ODT (ZOFRAN-ODT) disintegrating tablet 4 mg (has no administration in time range)     Or   ondansetron (ZOFRAN) injection 4 mg (has no administration in time range)   melatonin tablet 2.5 mg (has no administration in time range)   sennosides-docusate (PERICOLACE) 8.6-50 MG per tablet 2 tablet (has no administration in time range)     And   polyethylene glycol (MIRALAX) packet 17 g (has no administration in time range)     And   bisacodyl (DULCOLAX) EC tablet 5 mg (has no administration in time range)     And   bisacodyl (DULCOLAX) suppository 10 mg (has no administration in time range)   HYDROmorphone (DILAUDID) injection 0.5 mg  (has no administration in time range)   insulin regular (humuLIN R,novoLIN R) injection 5 Units (has no administration in time range)   lactated ringers bolus 1,000 mL (0 mL Intravenous Stopped 8/12/24 1715)   ondansetron (ZOFRAN) injection 4 mg (4 mg Intravenous Given 8/12/24 1513)   HYDROmorphone (DILAUDID) injection 0.5 mg (0.5 mg Intravenous Given 8/12/24 1511)   iopamidol (ISOVUE-300) 61 % injection 100 mL (85 mL Intravenous Given by Other 8/12/24 1538)   metroNIDAZOLE (FLAGYL) IVPB 500 mg (0 mg Intravenous Stopped 8/12/24 1751)   ondansetron (ZOFRAN) injection 4 mg (4 mg Intravenous Given 8/12/24 1647)   HYDROmorphone (DILAUDID) injection 0.5 mg (0.5 mg Intravenous Given 8/12/24 1647)             MEDICAL DECISION MAKING, PROGRESS, and CONSULTS    All labs have been independently reviewed by me.  All radiology studies have been reviewed by me and I have also reviewed the radiology report.   EKG's independently viewed and interpreted by me.  Discussion below represents my analysis of pertinent findings related to patient's condition, differential diagnosis, treatment plan and final disposition.      Additional sources:  - Discussed/ obtained information from independent historians: Patient's significant other is here who states her pain is become progressively worse    - External (non-ED) record review: I reviewed the patient's office note with her PCP where she was seen for type 2 diabetes in April 2023    - Chronic or social conditions impacting care: Patient lives with significant other    - Shared decision making: After shared decision-making schedule migration has been to the hospital for IV antibiotics and further evaluation and care      Orders placed during this visit:  Orders Placed This Encounter   Procedures    Blood Culture - Blood,    Blood Culture - Blood,    Gastrointestinal Panel, PCR - Stool, Per Rectum    Clostridioides difficile Toxin - Stool, Per Rectum    Clostridioides difficile Toxin, PCR  - Stool, Per Rectum    CT Abdomen Pelvis With Contrast    [place] Draw    Comprehensive Metabolic Panel    Lipase    Urinalysis With Microscopic If Indicated (No Culture) - Urine, Clean Catch    Lactic Acid, Plasma    CBC Auto Differential    Urinalysis, Microscopic Only - Urine, Clean Catch    STAT Lactic Acid, Reflex    Procalcitonin    Basic Metabolic Panel    CBC (No Diff)    Diet: Cardiac; Healthy Heart (2-3 Na+); Fluid Consistency: Thin (IDDSI 0)    Undress & Gown    Vital Signs    Up with assistance    Daily Weights    Strict Intake & Output    Oral Care    Place Sequential Compression Device    Maintain Sequential Compression Device    Code Status and Medical Interventions: CPR (Attempt to Resuscitate); Full    LHA (on-call MD unless specified) Details    Patient Isolation Contact Spore    POC Glucose Once    Insert Peripheral IV    Initiate Observation Status    CBC & Differential    Green Top (Gel)    Lavender Top    Gold Top - SST    Light Blue Top         Differential diagnosis:  My differential diagnosis includes but is not limited to gastritis, pancreatitis, cholecystitis, appendicitis, diverticulitis, urinary tract infection, kidney stone, or bowel obstruction.        Independent interpretation of labs, radiology studies, and discussions with consultants:  ED Course as of 08/12/24 1757   Mon Aug 12, 2024   1614 The patient's CT scan is consistent with an extensive colitis. [GP]   1615 She is currently receiving IV fluids.  I will give her IV antibiotics and check cultures and procalcitonin.  She will need to be admitted to the hospital.  I will order stool samples. [GP]   1642 I discussed the case with Dr. Cardenas who will admit the patient to the hospital for further evaluation and care.  She is aware the patient's colitis, hyperglycemia and elevated lactic acid [GP]      ED Course User Index  [GP] Isaiah Calixto MD               DIAGNOSIS  Final diagnoses:   Colitis   Lactic acidosis    Hyperglycemia   Lower abdominal pain         DISPOSITION  ADMISSION    Discussed treatment plan and reason for admission with pt/family and admitting physician.  Pt/family voiced understanding of the plan for admission for further testing/treatment as needed.            Latest Documented Vital Signs:  As of 17:57 EDT  BP- 133/82 HR- 78 Temp- 98.9 °F (37.2 °C) (Tympanic) O2 sat- 94%--      --------------------  Please note that portions of this were completed with a voice recognition program.       Note Disclaimer: At [facility], we believe that sharing information builds trust and better relationships. You are receiving this note because you are receiving care at [facility] or recently visited. It is possible you will see health information before a provider has talked with you about it. This kind of information can be easy to misunderstand. To help you fully understand what it means for your health, we urge you to discuss this note with your provider.             Isaiah Calixto MD  08/12/24 2012

## 2024-08-12 NOTE — ED NOTES
Nursing report ED to floor  Seble Delgadillo  54 y.o.  female    HPI :  HPI (Adult)  Stated Reason for Visit: abd pain  History Obtained From: patient    Chief Complaint  Chief Complaint   Patient presents with    Abdominal Pain    Diarrhea       Admitting doctor:   Sarah Cardenas MD    Admitting diagnosis:   The primary encounter diagnosis was Colitis. Diagnoses of Lactic acidosis, Hyperglycemia, and Lower abdominal pain were also pertinent to this visit.    Code status:   Current Code Status       Date Active Code Status Order ID Comments User Context       Not on file            Allergies:   Bupropion, Oxycodone-acetaminophen, and Buspirone    Isolation:   Contact Spore    Intake and Output  No intake or output data in the 24 hours ending 08/12/24 1657    Weight:   There were no vitals filed for this visit.    Most recent vitals:   Vitals:    08/12/24 1323 08/12/24 1343 08/12/24 1557   BP:  133/82    Pulse: 108  77   Resp: 16     Temp: 98.9 °F (37.2 °C)     TempSrc: Tympanic     SpO2: 93%  93%       Active LDAs/IV Access:   Lines, Drains & Airways       Active LDAs       Name Placement date Placement time Site Days    Peripheral IV 08/12/24 1510 Right Antecubital 08/12/24  1510  Antecubital  less than 1                    Labs (abnormal labs have a star):   Labs Reviewed   COMPREHENSIVE METABOLIC PANEL - Abnormal; Notable for the following components:       Result Value    Glucose 392 (*)     Sodium 134 (*)     CO2 21.0 (*)     All other components within normal limits    Narrative:     GFR Normal >60  Chronic Kidney Disease <60  Kidney Failure <15     URINALYSIS W/ MICROSCOPIC IF INDICATED (NO CULTURE) - Abnormal; Notable for the following components:    Specific Gravity, UA >1.030 (*)     Glucose, UA >=1000 mg/dL (3+) (*)     Ketones, UA 40 mg/dL (2+) (*)     Blood, UA Trace (*)     Protein, UA 30 mg/dL (1+) (*)     Nitrite, UA Positive (*)     All other components within normal limits   LACTIC ACID,  "PLASMA - Abnormal; Notable for the following components:    Lactate 2.4 (*)     All other components within normal limits   URINALYSIS, MICROSCOPIC ONLY - Abnormal; Notable for the following components:    WBC, UA 11-20 (*)     Bacteria, UA 3+ (*)     Squamous Epithelial Cells, UA 3-6 (*)     All other components within normal limits   LIPASE - Normal   CBC WITH AUTO DIFFERENTIAL - Normal   PROCALCITONIN - Normal    Narrative:     As a Marker for Sepsis (Non-Neonates):    1. <0.5 ng/mL represents a low risk of severe sepsis and/or septic shock.  2. >2 ng/mL represents a high risk of severe sepsis and/or septic shock.    As a Marker for Lower Respiratory Tract Infections that require antibiotic therapy:    PCT on Admission    Antibiotic Therapy       6-12 Hrs later    >0.5                Strongly Recommended  >0.25 - <0.5        Recommended   0.1 - 0.25          Discouraged              Remeasure/reassess PCT  <0.1                Strongly Discouraged     Remeasure/reassess PCT    As 28 day mortality risk marker: \"Change in Procalcitonin Result\" (>80% or <=80%) if Day 0 (or Day 1) and Day 4 values are available. Refer to [email or web address]    Change in PCT <=80%  A decrease of PCT levels below or equal to 80% defines a positive change in PCT test result representing a higher risk for 28-day all-cause mortality of patients diagnosed with severe sepsis for septic shock.    Change in PCT >80%  A decrease of PCT levels of more than 80% defines a negative change in PCT result representing a lower risk for 28-day all-cause mortality of patients diagnosed with severe sepsis or septic shock.      BLOOD CULTURE   BLOOD CULTURE   GASTROINTESTINAL PANEL, PCR (PREFERRED) DOES NOT INCLUDE CDIFF   CLOSTRIDIOIDES DIFFICILE TOXIN    Narrative:     The following orders were created for panel order Clostridioides difficile Toxin - Stool, Per Rectum.  Procedure                               Abnormality         Status   "                   ---------                               -----------         ------                     Clostridioides difficile...[[id number]]                                                   Please view results for these tests on the individual orders.   CLOSTRIDIOIDES DIFFICILE TOXIN, PCR   RAINBOW DRAW    Narrative:     The following orders were created for panel order [place] Draw.  Procedure                               Abnormality         Status                     ---------                               -----------         ------                     Green Top (Gel)[[id number]]                                  Final result               Lavender Top[[id number]]                                     Final result               Gold Top - SST[[id number]]                                   Final result               Light Blue Top[[id number]]                                   Final result                 Please view results for these tests on the individual orders.   LACTIC ACID, REFLEX   CBC AND DIFFERENTIAL    Narrative:     The following orders were created for panel order CBC & Differential.  Procedure                               Abnormality         Status                     ---------                               -----------         ------                     CBC Auto Differential[[id number]]        Normal              Final result                 Please view results for these tests on the individual orders.   GREEN TOP   LAVENDER TOP   GOLD TOP - SST   LIGHT BLUE TOP       EKG:   No orders to display       Meds given in ED:   Medications   sodium chloride 0.9 % flush 10 mL (has no administration in time range)   levoFLOXacin (LEVAQUIN) 750 mg/150 mL D5W (premix) (LEVAQUIN) 750 mg (has no administration in time range)   metroNIDAZOLE (FLAGYL) IVPB 500 mg (has no administration in time range)   lactated ringers infusion (has no administration in time range)   lactated ringers bolus 1,000 mL (1,000 mL Intravenous  New Bag 8/12/24 1515)   ondansetron (ZOFRAN) injection 4 mg (4 mg Intravenous Given 8/12/24 1513)   HYDROmorphone (DILAUDID) injection 0.5 mg (0.5 mg Intravenous Given 8/12/24 1511)   iopamidol (ISOVUE-300) 61 % injection 100 mL (85 mL Intravenous Given by Other 8/12/24 1538)   ondansetron (ZOFRAN) injection 4 mg (4 mg Intravenous Given 8/12/24 1647)   HYDROmorphone (DILAUDID) injection 0.5 mg (0.5 mg Intravenous Given 8/12/24 1647)       Imaging results:  No radiology results for the last day    Ambulatory status:   - assist x1    Social issues:   Social History     Socioeconomic History    Marital status:    Tobacco Use    Smoking status: Never    Smokeless tobacco: Never   Substance and Sexual Activity    Alcohol use: Yes     Comment: rarely    Drug use: No       Peripheral Neurovascular  Peripheral Neurovascular (Adult)  Peripheral Neurovascular WDL: WDL    Neuro Cognitive  Neuro Cognitive (Adult)  Cognitive/Neuro/Behavioral WDL: WDL    Learning  Learning Assessment (Adult)  Learning Readiness and Ability: no barriers identified    Respiratory  Respiratory WDL  Respiratory WDL: WDL    Abdominal Pain       Pain Assessments  Pain (Adult)  Preferred Pain Scale: FACES (Fuller-Baker FACES Pain Rating Scale)  FACES Pain Rating: Rest: 6-->hurts even more    NIH Stroke Scale       [facility] RN  08/12/24 16:57 EDT

## 2024-08-12 NOTE — LETTER
August 15, 2024     Patient: Seble Delgadillo   YOB: 1970   Date of Visit: 8/12/2024       To Whom It May Concern:      Seble Delgadillo may return to work on Monday, August 19, 2024.            Sincerely,  Chivo Worrell MD      No name on file    CC:   No Recipients

## 2024-08-13 LAB
ADV 40+41 DNA STL QL NAA+NON-PROBE: NOT DETECTED
ANION GAP SERPL CALCULATED.3IONS-SCNC: 9 MMOL/L (ref 5–15)
ASTRO TYP 1-8 RNA STL QL NAA+NON-PROBE: NOT DETECTED
BUN SERPL-MCNC: 9 MG/DL (ref 6–20)
BUN/CREAT SERPL: 14.3 (ref 7–25)
C CAYETANENSIS DNA STL QL NAA+NON-PROBE: NOT DETECTED
C COLI+JEJ+UPSA DNA STL QL NAA+NON-PROBE: NOT DETECTED
C DIFF TOX GENS STL QL NAA+PROBE: NEGATIVE
CALCIUM SPEC-SCNC: 8.7 MG/DL (ref 8.6–10.5)
CHLORIDE SERPL-SCNC: 102 MMOL/L (ref 98–107)
CO2 SERPL-SCNC: 22 MMOL/L (ref 22–29)
CREAT SERPL-MCNC: 0.63 MG/DL (ref 0.57–1)
CRYPTOSP DNA STL QL NAA+NON-PROBE: NOT DETECTED
DEPRECATED RDW RBC AUTO: 40.8 FL (ref 37–54)
E HISTOLYT DNA STL QL NAA+NON-PROBE: NOT DETECTED
EAEC PAA PLAS AGGR+AATA ST NAA+NON-PRB: NOT DETECTED
EC STX1+STX2 GENES STL QL NAA+NON-PROBE: NOT DETECTED
EGFRCR SERPLBLD CKD-EPI 2021: 104.3 ML/MIN/1.73
EPEC EAE GENE STL QL NAA+NON-PROBE: NOT DETECTED
ERYTHROCYTE [DISTWIDTH] IN BLOOD BY AUTOMATED COUNT: 12.2 % (ref 12.3–15.4)
ETEC LTA+ST1A+ST1B TOX ST NAA+NON-PROBE: NOT DETECTED
G LAMBLIA DNA STL QL NAA+NON-PROBE: NOT DETECTED
GLUCOSE BLDC GLUCOMTR-MCNC: 197 MG/DL (ref 70–130)
GLUCOSE BLDC GLUCOMTR-MCNC: 210 MG/DL (ref 70–130)
GLUCOSE BLDC GLUCOMTR-MCNC: 257 MG/DL (ref 70–130)
GLUCOSE SERPL-MCNC: 241 MG/DL (ref 65–99)
HCT VFR BLD AUTO: 36.7 % (ref 34–46.6)
HGB BLD-MCNC: 12.2 G/DL (ref 12–15.9)
MCH RBC QN AUTO: 30.4 PG (ref 26.6–33)
MCHC RBC AUTO-ENTMCNC: 33.2 G/DL (ref 31.5–35.7)
MCV RBC AUTO: 91.5 FL (ref 79–97)
NOROVIRUS GI+II RNA STL QL NAA+NON-PROBE: NOT DETECTED
P SHIGELLOIDES DNA STL QL NAA+NON-PROBE: NOT DETECTED
PLATELET # BLD AUTO: 141 10*3/MM3 (ref 140–450)
PMV BLD AUTO: 9.2 FL (ref 6–12)
POTASSIUM SERPL-SCNC: 3.5 MMOL/L (ref 3.5–5.2)
RBC # BLD AUTO: 4.01 10*6/MM3 (ref 3.77–5.28)
RVA RNA STL QL NAA+NON-PROBE: NOT DETECTED
S ENT+BONG DNA STL QL NAA+NON-PROBE: NOT DETECTED
SAPO I+II+IV+V RNA STL QL NAA+NON-PROBE: NOT DETECTED
SHIGELLA SP+EIEC IPAH ST NAA+NON-PROBE: NOT DETECTED
SODIUM SERPL-SCNC: 133 MMOL/L (ref 136–145)
V CHOL+PARA+VUL DNA STL QL NAA+NON-PROBE: NOT DETECTED
V CHOLERAE DNA STL QL NAA+NON-PROBE: NOT DETECTED
WBC NRBC COR # BLD AUTO: 5.7 10*3/MM3 (ref 3.4–10.8)
Y ENTEROCOL DNA STL QL NAA+NON-PROBE: NOT DETECTED

## 2024-08-13 PROCEDURE — 82948 REAGENT STRIP/BLOOD GLUCOSE: CPT

## 2024-08-13 PROCEDURE — 99204 OFFICE O/P NEW MOD 45 MIN: CPT | Performed by: NURSE PRACTITIONER

## 2024-08-13 PROCEDURE — 25810000003 LACTATED RINGERS PER 1000 ML: Performed by: EMERGENCY MEDICINE

## 2024-08-13 PROCEDURE — 87040 BLOOD CULTURE FOR BACTERIA: CPT | Performed by: INTERNAL MEDICINE

## 2024-08-13 PROCEDURE — 63710000001 INSULIN LISPRO (HUMAN) PER 5 UNITS: Performed by: INTERNAL MEDICINE

## 2024-08-13 PROCEDURE — 96366 THER/PROPH/DIAG IV INF ADDON: CPT

## 2024-08-13 PROCEDURE — 80048 BASIC METABOLIC PNL TOTAL CA: CPT | Performed by: INTERNAL MEDICINE

## 2024-08-13 PROCEDURE — 25010000002 HYDROMORPHONE PER 4 MG: Performed by: INTERNAL MEDICINE

## 2024-08-13 PROCEDURE — 63710000001 INSULIN GLARGINE PER 5 UNITS: Performed by: INTERNAL MEDICINE

## 2024-08-13 PROCEDURE — 25010000002 ONDANSETRON PER 1 MG: Performed by: INTERNAL MEDICINE

## 2024-08-13 PROCEDURE — 25010000002 LEVOFLOXACIN PER 250 MG: Performed by: INTERNAL MEDICINE

## 2024-08-13 PROCEDURE — 96361 HYDRATE IV INFUSION ADD-ON: CPT

## 2024-08-13 PROCEDURE — 85027 COMPLETE CBC AUTOMATED: CPT | Performed by: INTERNAL MEDICINE

## 2024-08-13 PROCEDURE — 25010000002 METRONIDAZOLE 500 MG/100ML SOLUTION: Performed by: INTERNAL MEDICINE

## 2024-08-13 PROCEDURE — 96376 TX/PRO/DX INJ SAME DRUG ADON: CPT

## 2024-08-13 PROCEDURE — G0378 HOSPITAL OBSERVATION PER HR: HCPCS

## 2024-08-13 RX ORDER — INSULIN LISPRO 100 [IU]/ML
2 INJECTION, SOLUTION INTRAVENOUS; SUBCUTANEOUS
Status: DISCONTINUED | OUTPATIENT
Start: 2024-08-13 | End: 2024-08-15 | Stop reason: HOSPADM

## 2024-08-13 RX ADMIN — PRAVASTATIN SODIUM 80 MG: 40 TABLET ORAL at 08:41

## 2024-08-13 RX ADMIN — SODIUM CHLORIDE, POTASSIUM CHLORIDE, SODIUM LACTATE AND CALCIUM CHLORIDE 125 ML/HR: 600; 310; 30; 20 INJECTION, SOLUTION INTRAVENOUS at 02:02

## 2024-08-13 RX ADMIN — Medication 1 TABLET: at 08:42

## 2024-08-13 RX ADMIN — INSULIN LISPRO 3 UNITS: 100 INJECTION, SOLUTION INTRAVENOUS; SUBCUTANEOUS at 21:15

## 2024-08-13 RX ADMIN — HYDROMORPHONE HYDROCHLORIDE 0.5 MG: 1 INJECTION, SOLUTION INTRAMUSCULAR; INTRAVENOUS; SUBCUTANEOUS at 00:16

## 2024-08-13 RX ADMIN — LISINOPRIL 10 MG: 10 TABLET ORAL at 08:41

## 2024-08-13 RX ADMIN — INSULIN LISPRO 4 UNITS: 100 INJECTION, SOLUTION INTRAVENOUS; SUBCUTANEOUS at 12:20

## 2024-08-13 RX ADMIN — INSULIN GLARGINE 20 UNITS: 100 INJECTION, SOLUTION SUBCUTANEOUS at 21:02

## 2024-08-13 RX ADMIN — METRONIDAZOLE 500 MG: 500 INJECTION, SOLUTION INTRAVENOUS at 01:32

## 2024-08-13 RX ADMIN — ONDANSETRON 4 MG: 2 INJECTION, SOLUTION INTRAMUSCULAR; INTRAVENOUS at 05:06

## 2024-08-13 RX ADMIN — LEVOFLOXACIN 750 MG: 5 INJECTION, SOLUTION INTRAVENOUS at 18:03

## 2024-08-13 RX ADMIN — HYDROMORPHONE HYDROCHLORIDE 0.5 MG: 1 INJECTION, SOLUTION INTRAMUSCULAR; INTRAVENOUS; SUBCUTANEOUS at 04:58

## 2024-08-13 RX ADMIN — HYDROMORPHONE HYDROCHLORIDE 0.5 MG: 1 INJECTION, SOLUTION INTRAMUSCULAR; INTRAVENOUS; SUBCUTANEOUS at 16:34

## 2024-08-13 RX ADMIN — SODIUM CHLORIDE, POTASSIUM CHLORIDE, SODIUM LACTATE AND CALCIUM CHLORIDE 125 ML/HR: 600; 310; 30; 20 INJECTION, SOLUTION INTRAVENOUS at 21:15

## 2024-08-13 RX ADMIN — INSULIN LISPRO 2 UNITS: 100 INJECTION, SOLUTION INTRAVENOUS; SUBCUTANEOUS at 18:03

## 2024-08-13 RX ADMIN — Medication 2000 UNITS: at 08:42

## 2024-08-13 RX ADMIN — INSULIN LISPRO 2 UNITS: 100 INJECTION, SOLUTION INTRAVENOUS; SUBCUTANEOUS at 18:02

## 2024-08-13 RX ADMIN — HYDROMORPHONE HYDROCHLORIDE 0.5 MG: 1 INJECTION, SOLUTION INTRAMUSCULAR; INTRAVENOUS; SUBCUTANEOUS at 09:06

## 2024-08-13 RX ADMIN — INSULIN LISPRO 3 UNITS: 100 INJECTION, SOLUTION INTRAVENOUS; SUBCUTANEOUS at 08:42

## 2024-08-13 RX ADMIN — ACETAMINOPHEN 650 MG: 325 TABLET ORAL at 21:15

## 2024-08-13 RX ADMIN — SODIUM CHLORIDE, POTASSIUM CHLORIDE, SODIUM LACTATE AND CALCIUM CHLORIDE 125 ML/HR: 600; 310; 30; 20 INJECTION, SOLUTION INTRAVENOUS at 10:37

## 2024-08-13 RX ADMIN — CITALOPRAM 20 MG: 20 TABLET, FILM COATED ORAL at 08:42

## 2024-08-13 NOTE — PROGRESS NOTES
Name: Seble Delgadillo ADMIT: 2024   : 1970  PCP: Yelena Guzman APRN    MRN: [id number] LOS: 0 days   AGE/SEX: 54 y.o. female  ROOM: [facility]/   Subjective   Chief Complaint   Patient presents with    Abdominal Pain    Diarrhea     53 yo with history of DM, HTN who presents with diarrhea, abdominal pain. She came to the ER and diagnosed with Colitis on CT scan. She was given levaquin ABX.     Still with abd pain  Worse with food  On abx  On IVFs    ROS  No f/c  No n/v  No cp/palp  No soa/cough    Objective   Vital Signs  Temp:  [97.6 °F (36.4 °C)-98.3 °F (36.8 °C)] 97.6 °F (36.4 °C)  Heart Rate:  [72-90] 77  Resp:  [16-18] 18  BP: (114-134)/(62-82) 134/78  SpO2:  [93 %-99 %] 99 %  on  Flow (L/min):  [1] 1;   Device (Oxygen Therapy): room air  Body mass index is 32.28 kg/m².    Physical Exam  Constitutional:       General: She is not in acute distress.  HENT:      Head: Normocephalic and atraumatic.   Eyes:      General: No scleral icterus.  Cardiovascular:      Rate and Rhythm: Regular rhythm.      Heart sounds: Normal heart sounds.   Pulmonary:      Effort: Pulmonary effort is normal. No respiratory distress.   Abdominal:      General: There is no distension.      Palpations: Abdomen is soft.      Tenderness: There is abdominal tenderness (mild generalized). There is no guarding.   Musculoskeletal:      Cervical back: Neck supple.   Neurological:      Mental Status: She is alert.   Psychiatric:         Behavior: Behavior normal.         Results Review:       I reviewed the patient's new clinical results.  Results from last 7 days   Lab Units 24  0504 24  1347   WBC 10*3/mm3 5.70 6.79   HEMOGLOBIN g/dL 12.2 14.3   PLATELETS 10*3/mm3 141 168     Results from last 7 days   Lab Units 24  0504 24  1347   SODIUM mmol/L 133* 134*   POTASSIUM mmol/L 3.5 3.9   CHLORIDE mmol/L 102 99   CO2 mmol/L 22.0 21.0*   BUN mg/dL 9 9   CREATININE mg/dL 0.63 0.82   GLUCOSE mg/dL 241* 392*  "  CrCl cannot be calculated (Unknown ideal weight.).  Results from last 7 days   Lab Units 08/12/24  1347   ALBUMIN g/dL 4.1   BILIRUBIN mg/dL 0.5   ALK PHOS U/L 86   AST (SGOT) U/L 15   ALT (SGPT) U/L 17     Results from last 7 days   Lab Units 08/13/24  0504 08/12/24  1347   CALCIUM mg/dL 8.7 9.9   ALBUMIN g/dL  --  4.1     Results from last 7 days   Lab Units 08/12/24  1701 08/12/24  1347   PROCALCITONIN ng/mL  --  0.06   LACTATE mmol/L 1.7 2.4*       Coag     HbA1C   Lab Results   Component Value Date    HGBA1C 14.2 (H) 02/22/2022    HGBA1C 12.2 11/03/2020    HGBA1C 10.30 (H) 07/23/2020     Infection   Results from last 7 days   Lab Units 08/12/24  1347   PROCALCITONIN ng/mL 0.06     Radiology(recent) No radiology results for the last day  No results found for: \"TROPONINT\", \"TROPONINI\", \"BNP\"  No components found for: \"TSH;2\"    cholecalciferol, 2,000 Units, Oral, Daily  citalopram, 20 mg, Oral, Daily  insulin glargine, 20 Units, Subcutaneous, Nightly  insulin lispro, 2-7 Units, Subcutaneous, 4x Daily AC & at Bedtime  levoFLOXacin, 750 mg, Intravenous, Q24H  lisinopril, 10 mg, Oral, Q24H  multivitamin, 1 tablet, Oral, Daily  pravastatin, 80 mg, Oral, Daily      lactated ringers, 125 mL/hr, Last Rate: 125 mL/hr (08/13/24 1302)    Diet: Liquid; Full Liquid; Fluid Consistency: Thin (IDDSI 0)      Assessment & Plan      Active Hospital Problems    Diagnosis  POA    **Colitis [K52.9]  Yes    Gastroesophageal reflux disease without esophagitis [K21.9]  Yes    Essential hypertension [I10]  Yes    Type 2 diabetes mellitus without complication, without long-term current use of insulin [E11.9]  Yes      Resolved Hospital Problems   No resolved problems to display.     53 yo with history of DM, HTN who presents with diarrhea, abdominal pain. She came to the ER and diagnosed with Colitis on CT scan. She was given levaquin ABX. Cdiff and stool PCR are negative.     On levquin. Decrease diet down to liquid as had pain with " regular. PRN agents for nausea/pain. GI is consulted  IVFs  Insulin, monitor BG  Agents for HTN, monitor BP      DW RN  Reviewed records    Chivo Worrell MD  [facility]  08/13/24  10:42 EDT

## 2024-08-13 NOTE — H&P
HISTORY AND PHYSICAL   [facility]        Date of Admission: 2024  Patient Identification:  Name: Seble Delgadillo  Age: 54 y.o.  Sex: female  :  1970  MRN: [id number]                     Primary Care Physician: Yelena Guzman APRN    Chief Complaint:  54 year old gentleman presented to the emergency room with abdominal pain, nausea and diarrhea which started a few days ago; she denies sick contacts; symptoms were worsening; she went to a little clinic who sent her to the ED; denies fever or chills     History of Present Illness:   As above    Past Medical History:  Past Medical History:   Diagnosis Date    Depression     Diabetes mellitus     Hyperlipidemia      Past Surgical History:  Past Surgical History:   Procedure Laterality Date    HYSTERECTOMY      TONSILLECTOMY        Home Meds:  Medications Prior to Admission   Medication Sig Dispense Refill Last Dose    aspirin 81 MG EC tablet Take 1 tablet by mouth Daily.   2024 at 0900    BD Pen Needle Magaly 2nd Gen 32G X 4 MM misc        cholecalciferol (VITAMIN D3) 25 MCG (1000 UT) tablet TAKE 2 TABLETS BY MOUTH EVERY DAY   2024 at 0900    citalopram (CeleXA) 20 MG tablet Take 1 tablet by mouth Daily. 90 tablet 3 2024    fluticasone (Flonase) 50 MCG/ACT nasal spray 2 sprays into the nostril(s) as directed by provider Daily. 3 bottle 3     glucosamine-chondroitin 500-400 MG capsule capsule Take  by mouth Daily.       HYDROcodone-acetaminophen (NORCO) 7.5-325 MG per tablet Take 1 tablet by mouth Every 8 (Eight) Hours As Needed for Moderate Pain  for up to 3 doses. 3 tablet 0     Insulin Glargine (Lantus SoloStar) 100 UNIT/ML injection pen INJECT 20 UNITS AT NIGHT 15 pen 0     Januvia 100 MG tablet TAKE 1 TABLET BY MOUTH EVERY DAY 90 tablet 2     metFORMIN (GLUCOPHAGE) 1000 MG tablet Take 1 tablet by mouth 2 (Two) Times a Day With Meals.   2024 at 0900    MULTIPLE VITAMIN PO Take  by mouth Daily.        omeprazole (PrilOSEC) 40 MG capsule Take 1 capsule by mouth Daily. 90 capsule 3 8/11/2024    pravastatin (PRAVACHOL) 80 MG tablet Take 1 tablet by mouth Daily. 90 tablet 3 8/11/2024    ramipril (ALTACE) 2.5 MG capsule TAKE 1 CAPSULE BY MOUTH EVERY DAY   8/11/2024    alendronate (FOSAMAX) 70 MG tablet TAKE 1 TABLET BY MOUTH EVERY 7 (SEVEN) DAYS. 12 tablet 3 Unknown    Calcium Carbonate 1500 (600 Ca) MG tablet TAKE 1 TABLET BY MOUTH TWICE A DAY       doxepin (SINEquan) 10 MG capsule Take 1 capsule by mouth Every Night. 90 capsule 3     Empagliflozin 25 MG tablet Take 25 mg by mouth Daily.       guaiFENesin (Mucinex) 600 MG 12 hr tablet Take  by mouth.   Unknown    loratadine (Claritin) 10 MG tablet Take 10 mg by mouth Daily.          Allergies:  Allergies   Allergen Reactions    Bupropion Other (See Comments)     Caused suicidal thoughts     Oxycodone-Acetaminophen Itching    Buspirone Other (See Comments)     Immunizations:  Immunization History   Administered Date(s) Administered    COVID-19 (PFIZER) BIVALENT 12+YRS 10/05/2022    COVID-19 (PFIZER) Purple Cap Monovalent 12/22/2020, 01/12/2021     Social History:   Social History     Social History Narrative    Not on file     Social History     Socioeconomic History    Marital status:    Tobacco Use    Smoking status: Never     Passive exposure: Current    Smokeless tobacco: Never   Vaping Use    Vaping status: Never Used   Substance and Sexual Activity    Alcohol use: Yes     Comment: rarely    Drug use: No    Sexual activity: Defer       Family History:  Family History   Problem Relation Age of Onset    Other Mother         non hodgkins lymphoma     Diabetes Mother     Diabetes Father     Other Sister         prediabetes    Diabetes Maternal Grandmother     Diabetes Paternal Grandmother     Prostate cancer Paternal Grandfather         Review of Systems  See history of present illness and past medical history.  Patient denies headache, dizziness, syncope,  falls, trauma, change in vision, change in hearing, change in taste, changes in weight, changes in appetite, focal weakness, numbness, or paresthesia.  Patient denies chest pain, palpitations, dyspnea, orthopnea, PND, cough, sinus pressure, rhinorrhea, epistaxis, hemoptysis,  vomiting,hematemesis,  constipation or hematochezia.  Denies cold or heat intolerance, polydipsia, polyuria, polyphagia. Denies hematuria, pyuria, dysuria, hesitancy, frequency or urgency. Denies consumption of raw and under cooked meats foods or change in water source.  Denies fever, chills, sweats, night sweats.  Denies missing any routine medications. Remainder of ROS is negative.    Objective:  T Max 24 hrs: Temp (24hrs), Av.6 °F (37 °C), Min:98.3 °F (36.8 °C), Max:98.9 °F (37.2 °C)    Vitals Ranges:   Temp:  [98.3 °F (36.8 °C)-98.9 °F (37.2 °C)] 98.3 °F (36.8 °C)  Heart Rate:  [] 84  Resp:  [16] 16  BP: (125-133)/(65-82) 131/81      Exam:  /81 (BP Location: Right arm, Patient Position: Lying)   Pulse 84   Temp 98.3 °F (36.8 °C) (Oral)   Resp 16   SpO2 97%     General Appearance:    Alert, cooperative, no distress, appears stated age   Head:    Normocephalic, without obvious abnormality, atraumatic   Eyes:    PERRL, conjunctivae/corneas clear, EOM's intact, both eyes   Ears:    Normal external ear canals, both ears   Nose:   Nares normal, septum midline, mucosa normal, no drainage    or sinus tenderness   Throat:   Lips, mucosa, and tongue normal   Neck:   Supple, symmetrical, trachea midline, no adenopathy;     thyroid:  no enlargement/tenderness/nodules; no carotid    bruit or JVD   Back:     Symmetric, no curvature, ROM normal, no CVA tenderness   Lungs:     Clear to auscultation bilaterally, respirations unlabored   Chest Wall:    No tenderness or deformity    Heart:    Regular rate and rhythm, S1 and S2 normal, no murmur, rub   or gallop   Abdomen:     Soft, nontender, bowel sounds active all four quadrants,     no  masses, no hepatomegaly, no splenomegaly   Extremities:   Extremities normal, atraumatic, no cyanosis or edema                       .    Data Review:  Labs in chart were reviewed.  WBC   Date Value Ref Range Status   08/12/2024 6.79 3.40 - 10.80 10*3/mm3 Final     Hemoglobin   Date Value Ref Range Status   08/12/2024 14.3 12.0 - 15.9 g/dL Final     Hematocrit   Date Value Ref Range Status   08/12/2024 44.2 34.0 - 46.6 % Final     Platelets   Date Value Ref Range Status   08/12/2024 168 140 - 450 10*3/mm3 Final     Sodium   Date Value Ref Range Status   08/12/2024 134 (L) 136 - 145 mmol/L Final     Potassium   Date Value Ref Range Status   08/12/2024 3.9 3.5 - 5.2 mmol/L Final     Chloride   Date Value Ref Range Status   08/12/2024 99 98 - 107 mmol/L Final     CO2   Date Value Ref Range Status   08/12/2024 21.0 (L) 22.0 - 29.0 mmol/L Final     BUN   Date Value Ref Range Status   08/12/2024 9 6 - 20 mg/dL Final     Creatinine   Date Value Ref Range Status   08/12/2024 0.82 0.57 - 1.00 mg/dL Final     Glucose   Date Value Ref Range Status   08/12/2024 392 (H) 65 - 99 mg/dL Final     Calcium   Date Value Ref Range Status   08/12/2024 9.9 8.6 - 10.5 mg/dL Final     AST (SGOT)   Date Value Ref Range Status   08/12/2024 15 1 - 32 U/L Final     ALT (SGPT)   Date Value Ref Range Status   08/12/2024 17 1 - 33 U/L Final     Alkaline Phosphatase   Date Value Ref Range Status   08/12/2024 86 39 - 117 U/L Final                Imaging Results (All)       Procedure Component Value Units Date/Time    CT Abdomen Pelvis With Contrast [[id number]] Collected: 08/12/24 1610     Updated: 08/12/24 1611    Narrative:      CT ABDOMEN AND PELVIS WITH IV CONTRAST     HISTORY: 54-year-old female with lower abdominal pain. Hysterectomy in  the past.     TECHNIQUE: Radiation dose reduction techniques were utilized, including  automated exposure control and exposure modulation based on body size.   3 mm images were obtained through the abdomen and  pelvis after the  administration of IV contrast. No previous CT for comparison.     FINDINGS:  1. There is extensive colitis involving a long segment of sigmoid colon  and rectum. Extensive thickening and surrounding inflammatory  change/edema. The appearance can be seen with pseudomembranous colitis  and an extensive infectious colitis. There is very small volume of free  fluid, but there is no fluid collection or abscess. The colon appears  otherwise unremarkable. There is secondary mild thickening of an  adjacent segment of small bowel, series 2 image 131.  There is no appendicitis. No bowel ileus or obstruction.     2. There is cholelithiasis without cholecystitis and there is no biliary  dilatation. The liver, pancreas, spleen, adrenals, and kidneys appear  unremarkable. Uterus and ovaries are surgically absent.  Urinary bladder is significantly thick walled, but is partially  collapsed. Please correlate clinically for acute UTI/cystitis.     3. At the visualized lower chest, there are very ill-defined patchy  groundglass opacities at the right lung base, and very faint groundglass  density at the left lung base. Pulmonary vasculature is prominent. This  may possibly represent mild edema or mild atypical pneumonia.                 Assessment:  Active Hospital Problems    Diagnosis  POA    **Colitis [K52.9]  Yes      Resolved Hospital Problems   No resolved problems to display.   Diarrhea  Nausea  Hypertension  Diabetes  hyperlipidemia    Plan:  Will continue antibiotics  Ask gi to see her  Stool studies  Fluids  Dw patient, family and ed provider    Sarah Cardenas MD  8/12/2024  22:32 EDT

## 2024-08-13 NOTE — CASE MANAGEMENT/SOCIAL WORK
Continued Stay Note  [facility]     Patient Name: Seble Delgadillo  MRN: [id number]  Today's Date: 8/13/2024    Admit Date: 8/12/2024    Plan: Plan home with spouse.  KEV Pope RN   Discharge Plan       Row Name 08/13/24 0810       Plan    Plan Plan home with spouse.  KEV Pope RN    Patient/Family in Agreement with Plan yes    Plan Comments FACE SHEET VERIFIED.  Spoke with pt at bedside. Pt's PCP is TIMOTHY Guzmán.  Pt lives with her spouse ( Monroe Delgadillo [phone]) in a second floor apartment.  Pt is independent with ADLs. Pt has a glucometer for home use. Pt gets her prescriptions at [facility]  ([address]).  Pt denies any issues affording medications. Pt is not current with .  Pt has not been in a SNF. Pt denies any discharge needs.  Pt's spouse will assist pt at home and transport pt home. Plan home with spouse.  KEV Pope RN                   Discharge Codes    No documentation.                 Expected Discharge Date and Time       Expected Discharge Date Expected Discharge Time    Aug 16, 2024               Julia Pope RN

## 2024-08-13 NOTE — PLAN OF CARE
Goal Outcome Evaluation:      Pt. Admitted to this unit around 1945 with diagnosis of colitis, with c/o abdominal pain , n/v, and diarrhea , transferring to bed from stretcher self with 1 staff and transportation person standby, Pt. Ambulatory, Pt. C/o pain as getting into bed, pain injection done as ordered, diarrhea noted x3, stool collected for GI panel and c-diff at this shift, v/s stable, 02 sats slightly down to 88%, 02 inhal. In placed @1l/m per n/c, no short of air noted, GI consulted at this shift, pain medicine injection done x2 more until this time, PT alert, oriented x4, Pt. Refused to check Weight at this morning time, no s/s acute distress noted at this time

## 2024-08-13 NOTE — CONSULTS
[facility]  Initial Inpatient Consult Note    Referring Provider: [facility]     Reason for Consultation: Diarrhea and abdominal pain    Subjective     History of present illness:      Thank you for allowing us to participate in the care of this patient.    54 y.o. female with a history of diabetes and hyperlipidemia who presented to the hospital with diarrhea and abdominal pain.  Patient found to have colitis on CT.    CT A/P with contrast reviewed dated 8/13 - extensive colitis involving a long segment of the sigmoid colon and rectum.  No evidence of abscess.  Mild thickening of the adjacent small bowel.  Cholelithiasis without biliary ductal dilation.    GI PCR and C. difficile negative.  Blood cultures pending.  Blood cell count and hemoglobin normal this morning.    Patient currently resting comfortably in bed.  She reports lower abdominal discomfort slightly improved over yesterday.  She still passing liquid stools roughly 5 in last 24 hours without rectal bleeding or rectal pain.  She is receiving IV Levaquin and Flagyl.  She tried to eat some eggs this morning which made her feel worse.  She is however tolerating a full liquid diet.    Last colonoscopy was 2018 (reviewed) normal findings with poor prep.   Recall 5 years.    Past Medical History:  Past Medical History:   Diagnosis Date    Depression     Diabetes mellitus     Hyperlipidemia      Past Surgical History:  Past Surgical History:   Procedure Laterality Date    HYSTERECTOMY  2002    TONSILLECTOMY  2010      Social History:   Social History     Tobacco Use    Smoking status: Never     Passive exposure: Current    Smokeless tobacco: Never   Substance Use Topics    Alcohol use: Yes     Comment: rarely      Family History:  Family History   Problem Relation Age of Onset    Other Mother         non hodgkins lymphoma     Diabetes Mother     Diabetes Father     Other Sister         prediabetes    Diabetes Maternal Grandmother     Diabetes  Paternal Grandmother     Prostate cancer Paternal Grandfather        Home Meds:  Medications Prior to Admission   Medication Sig Dispense Refill Last Dose    aspirin 81 MG EC tablet Take 1 tablet by mouth Daily.   8/11/2024 at 0900    BD Pen Needle Magaly 2nd Gen 32G X 4 MM misc        cholecalciferol (VITAMIN D3) 25 MCG (1000 UT) tablet TAKE 2 TABLETS BY MOUTH EVERY DAY   8/11/2024 at 0900    citalopram (CeleXA) 20 MG tablet Take 1 tablet by mouth Daily. 90 tablet 3 8/11/2024    fluticasone (Flonase) 50 MCG/ACT nasal spray 2 sprays into the nostril(s) as directed by provider Daily. 3 bottle 3     glucosamine-chondroitin 500-400 MG capsule capsule Take  by mouth Daily.       HYDROcodone-acetaminophen (NORCO) 7.5-325 MG per tablet Take 1 tablet by mouth Every 8 (Eight) Hours As Needed for Moderate Pain  for up to 3 doses. 3 tablet 0     Insulin Glargine (Lantus SoloStar) 100 UNIT/ML injection pen INJECT 20 UNITS AT NIGHT 15 pen 0     Januvia 100 MG tablet TAKE 1 TABLET BY MOUTH EVERY DAY 90 tablet 2     metFORMIN (GLUCOPHAGE) 1000 MG tablet Take 1 tablet by mouth 2 (Two) Times a Day With Meals.   8/11/2024 at 0900    MULTIPLE VITAMIN PO Take  by mouth Daily.       omeprazole (PrilOSEC) 40 MG capsule Take 1 capsule by mouth Daily. 90 capsule 3 8/11/2024    pravastatin (PRAVACHOL) 80 MG tablet Take 1 tablet by mouth Daily. 90 tablet 3 8/11/2024    ramipril (ALTACE) 2.5 MG capsule TAKE 1 CAPSULE BY MOUTH EVERY DAY   8/11/2024    alendronate (FOSAMAX) 70 MG tablet TAKE 1 TABLET BY MOUTH EVERY 7 (SEVEN) DAYS. 12 tablet 3 Unknown    Calcium Carbonate 1500 (600 Ca) MG tablet TAKE 1 TABLET BY MOUTH TWICE A DAY       doxepin (SINEquan) 10 MG capsule Take 1 capsule by mouth Every Night. 90 capsule 3     Empagliflozin 25 MG tablet Take 25 mg by mouth Daily.       guaiFENesin (Mucinex) 600 MG 12 hr tablet Take  by mouth.   Unknown    loratadine (Claritin) 10 MG tablet Take 10 mg by mouth Daily.        Current Meds:    cholecalciferol, 2,000 Units, Oral, Daily  citalopram, 20 mg, Oral, Daily  insulin glargine, 20 Units, Subcutaneous, Nightly  insulin lispro, 2 Units, Subcutaneous, TID With Meals  insulin lispro, 2-7 Units, Subcutaneous, 4x Daily AC & at Bedtime  levoFLOXacin, 750 mg, Intravenous, Q24H  lisinopril, 10 mg, Oral, Q24H  multivitamin, 1 tablet, Oral, Daily  pravastatin, 80 mg, Oral, Daily      Allergies:  Allergies   Allergen Reactions    Bupropion Other (See Comments)     Caused suicidal thoughts     Oxycodone-Acetaminophen Itching    Buspirone Other (See Comments)     Review of Systems  History obtained from chart review and the patient  Gastrointestinal ROS: positive for - abdominal pain and diarrhea    Objective     Vital Signs  Temp:  [97.6 °F (36.4 °C)-98.3 °F (36.8 °C)] 97.6 °F (36.4 °C)  Heart Rate:  [72-84] 77  Resp:  [16-18] 18  BP: (114-134)/(62-82) 134/78  Physical Exam:   Constitutional:    Alert, cooperative, in no acute distress    Abdomen:     Soft, nondistended, lower abdominal tenderness noted; normal bowel sounds , no organomegaly    Results Review:   I reviewed the patient's new clinical results.    Results from last 7 days   Lab Units 08/13/24  0504 08/12/24  1347   WBC 10*3/mm3 5.70 6.79   HEMOGLOBIN g/dL 12.2 14.3   HEMATOCRIT % 36.7 44.2   PLATELETS 10*3/mm3 141 168     Results from last 7 days   Lab Units 08/13/24  0504 08/12/24  1347   SODIUM mmol/L 133* 134*   POTASSIUM mmol/L 3.5 3.9   CHLORIDE mmol/L 102 99   CO2 mmol/L 22.0 21.0*   BUN mg/dL 9 9   CREATININE mg/dL 0.63 0.82   CALCIUM mg/dL 8.7 9.9   BILIRUBIN mg/dL  --  0.5   ALK PHOS U/L  --  86   ALT (SGPT) U/L  --  17   AST (SGOT) U/L  --  15   GLUCOSE mg/dL 241* 392*         Lab Results   Lab Value Date/Time    LIPASE 26 08/12/2024 1347       Radiology:  CT Abdomen Pelvis With Contrast   Final Result          Assessment & Plan   Active Hospital Problems    Diagnosis     **Colitis     Gastroesophageal reflux disease without  esophagitis     Essential hypertension     Type 2 diabetes mellitus without complication, without long-term current use of insulin        Assessment:  Abdominal pain  Diarrhea  CT A/P with colitis    Plan:  Continue full liquid diet  Continue IV antibiotics  CBC, CMP, CRP and sed rate in the morning  Patient will need updated colonoscopy 8 weeks following hospital course  Pain management per primary team    I discussed the patients findings and my recommendations with patient.    Dragon dictation used throughout this note.            TIMOTHY Ellison  [facility]  [address] [place] [address]  Office: [phone]

## 2024-08-13 NOTE — CASE MANAGEMENT/SOCIAL WORK
Discharge Planning Assessment  [facility]     Patient Name: Seble Delgadillo  MRN: [id number]  Today's Date: 8/13/2024    Admit Date: 8/12/2024    Plan: Plan home with spouse.  KEV Pope RN   Discharge Needs Assessment       Row Name 08/13/24 0827       Living Environment    People in Home spouse    Name(s) of People in Home Spouse  ( Monroe Delgadillo  [phone])    Current Living Arrangements apartment    Potentially Unsafe Housing Conditions none    In the past 12 months has the electric, gas, oil, or water company threatened to shut off services in your home? No    Primary Care Provided by self    Provides Primary Care For no one    Family Caregiver if Needed spouse    Family Caregiver Names Spouse ( Monroe Delgadillo [phone])    Quality of Family Relationships helpful;supportive    Able to Return to Prior Arrangements yes    Living Arrangement Comments Pt lives with her spouse  ( Monroe Delgadillo [phone]) in a second floor apartment.       Resource/Environmental Concerns    Resource/Environmental Concerns none    Transportation Concerns none       Transportation Needs    In the past 12 months, has lack of transportation kept you from medical appointments or from getting medications? no    In the past 12 months, has lack of transportation kept you from meetings, work, or from getting things needed for daily living? No       Food Insecurity    Within the past 12 months, you worried that your food would run out before you got the money to buy more. Never true    Within the past 12 months, the food you bought just didn't last and you didn't have money to get more. Never true       Transition Planning    Patient/Family Anticipates Transition to home with family    Patient/Family Anticipated Services at Transition none    Transportation Anticipated family or friend will provide       Discharge Needs Assessment    Readmission Within the Last 30 Days no previous admission in last 30 days    Equipment Currently  Used at Home glucometer    Concerns to be Addressed no discharge needs identified;denies needs/concerns at this time    Anticipated Changes Related to Illness none    Equipment Needed After Discharge glucometer                   Discharge Plan       Row Name 08/13/24 0827       Plan    Plan Plan home with spouse.  KEV Pope RN    Patient/Family in Agreement with Plan yes    Plan Comments FACE SHEET VERIFIED.  Spoke with pt at bedside. Pt's PCP is TIMOTHY Guzmán.  Pt lives with her spouse ( Monroe Delgadillo [phone]) in a second floor apartment.  Pt is independent with ADLs. Pt has a glucometer for home use. Pt gets her prescriptions at [facility]  ([address]  Pt denies any issues affording medications. Pt is not current with .  Pt has not been in a SNF. Pt denies any discharge needs.  Pt's spouse will assist pt at home and transport pt home. Plan home with spouse.  KEV Pope RN                  Continued Care and Services - Admitted Since 8/12/2024    No active coordination exists for this encounter.       Expected Discharge Date and Time       Expected Discharge Date Expected Discharge Time    Aug 16, 2024            Demographic Summary       Row Name 08/13/24 0836       General Information    Admission Type observation    Arrived From emergency department    Referral Source admission list    Reason for Consult discharge planning    Preferred Language English                   Functional Status       Row Name 08/13/24 0864       Functional Status    Usual Activity Tolerance good    Current Activity Tolerance good       Functional Status, IADL    Medications independent    Meal Preparation independent    Housekeeping independent    Laundry independent    Shopping independent       Mental Status    General Appearance WDL WDL                   Psychosocial    No documentation.                  Abuse/Neglect    No documentation.                  Legal    No documentation.                   Substance Abuse    No documentation.                  Patient Forms    No documentation.                     Julia Pope RN

## 2024-08-14 LAB
ALBUMIN SERPL-MCNC: 3.5 G/DL (ref 3.5–5.2)
ALBUMIN/GLOB SERPL: 1.2 G/DL
ALP SERPL-CCNC: 70 U/L (ref 39–117)
ALT SERPL W P-5'-P-CCNC: 17 U/L (ref 1–33)
ANION GAP SERPL CALCULATED.3IONS-SCNC: 8.3 MMOL/L (ref 5–15)
AST SERPL-CCNC: 22 U/L (ref 1–32)
BASOPHILS # BLD AUTO: 0.02 10*3/MM3 (ref 0–0.2)
BASOPHILS NFR BLD AUTO: 0.4 % (ref 0–1.5)
BILIRUB SERPL-MCNC: 0.5 MG/DL (ref 0–1.2)
BUN SERPL-MCNC: 6 MG/DL (ref 6–20)
BUN/CREAT SERPL: 9.2 (ref 7–25)
CALCIUM SPEC-SCNC: 9.6 MG/DL (ref 8.6–10.5)
CHLORIDE SERPL-SCNC: 103 MMOL/L (ref 98–107)
CO2 SERPL-SCNC: 25.7 MMOL/L (ref 22–29)
CREAT SERPL-MCNC: 0.65 MG/DL (ref 0.57–1)
CRP SERPL-MCNC: 0.53 MG/DL (ref 0–0.5)
DEPRECATED RDW RBC AUTO: 40.2 FL (ref 37–54)
EGFRCR SERPLBLD CKD-EPI 2021: 103.5 ML/MIN/1.73
EOSINOPHIL # BLD AUTO: 0.35 10*3/MM3 (ref 0–0.4)
EOSINOPHIL NFR BLD AUTO: 6.9 % (ref 0.3–6.2)
ERYTHROCYTE [DISTWIDTH] IN BLOOD BY AUTOMATED COUNT: 12.3 % (ref 12.3–15.4)
ERYTHROCYTE [SEDIMENTATION RATE] IN BLOOD: 20 MM/HR (ref 0–30)
GLOBULIN UR ELPH-MCNC: 2.9 GM/DL
GLUCOSE BLDC GLUCOMTR-MCNC: 114 MG/DL (ref 70–130)
GLUCOSE BLDC GLUCOMTR-MCNC: 192 MG/DL (ref 70–130)
GLUCOSE BLDC GLUCOMTR-MCNC: 212 MG/DL (ref 70–130)
GLUCOSE BLDC GLUCOMTR-MCNC: 222 MG/DL (ref 70–130)
GLUCOSE SERPL-MCNC: 146 MG/DL (ref 65–99)
HCT VFR BLD AUTO: 36.1 % (ref 34–46.6)
HGB BLD-MCNC: 12 G/DL (ref 12–15.9)
IMM GRANULOCYTES # BLD AUTO: 0.01 10*3/MM3 (ref 0–0.05)
IMM GRANULOCYTES NFR BLD AUTO: 0.2 % (ref 0–0.5)
LYMPHOCYTES # BLD AUTO: 1.91 10*3/MM3 (ref 0.7–3.1)
LYMPHOCYTES NFR BLD AUTO: 37.5 % (ref 19.6–45.3)
MCH RBC QN AUTO: 30 PG (ref 26.6–33)
MCHC RBC AUTO-ENTMCNC: 33.2 G/DL (ref 31.5–35.7)
MCV RBC AUTO: 90.3 FL (ref 79–97)
MONOCYTES # BLD AUTO: 0.46 10*3/MM3 (ref 0.1–0.9)
MONOCYTES NFR BLD AUTO: 9 % (ref 5–12)
NEUTROPHILS NFR BLD AUTO: 2.34 10*3/MM3 (ref 1.7–7)
NEUTROPHILS NFR BLD AUTO: 46 % (ref 42.7–76)
NRBC BLD AUTO-RTO: 0 /100 WBC (ref 0–0.2)
PLATELET # BLD AUTO: 154 10*3/MM3 (ref 140–450)
PMV BLD AUTO: 9.1 FL (ref 6–12)
POTASSIUM SERPL-SCNC: 3.1 MMOL/L (ref 3.5–5.2)
PROT SERPL-MCNC: 6.4 G/DL (ref 6–8.5)
RBC # BLD AUTO: 4 10*6/MM3 (ref 3.77–5.28)
SODIUM SERPL-SCNC: 137 MMOL/L (ref 136–145)
WBC NRBC COR # BLD AUTO: 5.09 10*3/MM3 (ref 3.4–10.8)

## 2024-08-14 PROCEDURE — 25010000002 LEVOFLOXACIN PER 250 MG: Performed by: INTERNAL MEDICINE

## 2024-08-14 PROCEDURE — 85025 COMPLETE CBC W/AUTO DIFF WBC: CPT | Performed by: NURSE PRACTITIONER

## 2024-08-14 PROCEDURE — 82948 REAGENT STRIP/BLOOD GLUCOSE: CPT

## 2024-08-14 PROCEDURE — 99214 OFFICE O/P EST MOD 30 MIN: CPT | Performed by: NURSE PRACTITIONER

## 2024-08-14 PROCEDURE — 25810000003 LACTATED RINGERS PER 1000 ML: Performed by: INTERNAL MEDICINE

## 2024-08-14 PROCEDURE — G0378 HOSPITAL OBSERVATION PER HR: HCPCS

## 2024-08-14 PROCEDURE — 85652 RBC SED RATE AUTOMATED: CPT | Performed by: NURSE PRACTITIONER

## 2024-08-14 PROCEDURE — 63710000001 INSULIN LISPRO (HUMAN) PER 5 UNITS: Performed by: INTERNAL MEDICINE

## 2024-08-14 PROCEDURE — 86140 C-REACTIVE PROTEIN: CPT | Performed by: NURSE PRACTITIONER

## 2024-08-14 PROCEDURE — 25010000002 HYDROMORPHONE PER 4 MG: Performed by: INTERNAL MEDICINE

## 2024-08-14 PROCEDURE — 96376 TX/PRO/DX INJ SAME DRUG ADON: CPT

## 2024-08-14 PROCEDURE — 80053 COMPREHEN METABOLIC PANEL: CPT | Performed by: NURSE PRACTITIONER

## 2024-08-14 PROCEDURE — 63710000001 INSULIN GLARGINE PER 5 UNITS: Performed by: INTERNAL MEDICINE

## 2024-08-14 RX ORDER — POTASSIUM CHLORIDE 750 MG/1
40 TABLET, FILM COATED, EXTENDED RELEASE ORAL ONCE
Status: COMPLETED | OUTPATIENT
Start: 2024-08-14 | End: 2024-08-14

## 2024-08-14 RX ORDER — DICYCLOMINE HYDROCHLORIDE 10 MG/1
10 CAPSULE ORAL 4 TIMES DAILY PRN
Status: DISCONTINUED | OUTPATIENT
Start: 2024-08-14 | End: 2024-08-15 | Stop reason: HOSPADM

## 2024-08-14 RX ORDER — HYDROCODONE BITARTRATE AND ACETAMINOPHEN 5; 325 MG/1; MG/1
1 TABLET ORAL EVERY 4 HOURS PRN
Status: DISCONTINUED | OUTPATIENT
Start: 2024-08-14 | End: 2024-08-15 | Stop reason: HOSPADM

## 2024-08-14 RX ADMIN — Medication 2000 UNITS: at 08:48

## 2024-08-14 RX ADMIN — INSULIN LISPRO 3 UNITS: 100 INJECTION, SOLUTION INTRAVENOUS; SUBCUTANEOUS at 12:26

## 2024-08-14 RX ADMIN — CITALOPRAM 20 MG: 20 TABLET, FILM COATED ORAL at 08:48

## 2024-08-14 RX ADMIN — INSULIN LISPRO 2 UNITS: 100 INJECTION, SOLUTION INTRAVENOUS; SUBCUTANEOUS at 12:26

## 2024-08-14 RX ADMIN — INSULIN LISPRO 2 UNITS: 100 INJECTION, SOLUTION INTRAVENOUS; SUBCUTANEOUS at 21:06

## 2024-08-14 RX ADMIN — Medication 1 TABLET: at 08:48

## 2024-08-14 RX ADMIN — PRAVASTATIN SODIUM 80 MG: 40 TABLET ORAL at 08:48

## 2024-08-14 RX ADMIN — LISINOPRIL 10 MG: 10 TABLET ORAL at 08:48

## 2024-08-14 RX ADMIN — INSULIN GLARGINE 23 UNITS: 100 INJECTION, SOLUTION SUBCUTANEOUS at 21:06

## 2024-08-14 RX ADMIN — INSULIN LISPRO 3 UNITS: 100 INJECTION, SOLUTION INTRAVENOUS; SUBCUTANEOUS at 08:48

## 2024-08-14 RX ADMIN — LEVOFLOXACIN 750 MG: 5 INJECTION, SOLUTION INTRAVENOUS at 18:08

## 2024-08-14 RX ADMIN — HYDROMORPHONE HYDROCHLORIDE 0.5 MG: 1 INJECTION, SOLUTION INTRAMUSCULAR; INTRAVENOUS; SUBCUTANEOUS at 13:09

## 2024-08-14 RX ADMIN — HYDROMORPHONE HYDROCHLORIDE 0.5 MG: 1 INJECTION, SOLUTION INTRAMUSCULAR; INTRAVENOUS; SUBCUTANEOUS at 07:41

## 2024-08-14 RX ADMIN — INSULIN LISPRO 2 UNITS: 100 INJECTION, SOLUTION INTRAVENOUS; SUBCUTANEOUS at 08:48

## 2024-08-14 RX ADMIN — SODIUM CHLORIDE, POTASSIUM CHLORIDE, SODIUM LACTATE AND CALCIUM CHLORIDE 75 ML/HR: 600; 310; 30; 20 INJECTION, SOLUTION INTRAVENOUS at 22:53

## 2024-08-14 RX ADMIN — POTASSIUM CHLORIDE 40 MEQ: 750 TABLET, EXTENDED RELEASE ORAL at 15:37

## 2024-08-14 NOTE — PLAN OF CARE
Goal Outcome Evaluation:      Patient is resting well at this time and has some mild to moderate intermittent abdominal pain. She is getting up ad keyla to the bathroom and has had several bowel movements in the past 24 hours. VSS, she is NSR on the tele monitor, and is able to make her needs known. Patient continues on IV fluids as ordered and she is tolerating the fluids without issue.

## 2024-08-14 NOTE — PROGRESS NOTES
[facility]  Inpatient Progress Note    Reason for Follow Up: Diarrhea and abdominal pain    Subjective     Interval History:     Continued lower abdominal pain this morning however slightly improved over yesterday.  She still had several episodes of diarrhea overnight without rectal bleeding or rectal pain.  She would like to advance to a full liquid diet.  She does not feel like she is ready for solid food yet.  I have added additional lab work to be drawn this morning.  She continues on IV Levaquin. Blood cultures negative.      Current Facility-Administered Medications:     acetaminophen (TYLENOL) tablet 650 mg, 650 mg, Oral, Q4H PRN, 650 mg at 08/13/24 2115 **OR** acetaminophen (TYLENOL) 160 MG/5ML oral solution 650 mg, 650 mg, Oral, Q4H PRN **OR** acetaminophen (TYLENOL) suppository 650 mg, 650 mg, Rectal, Q4H PRN, Sarah Cardenas MD    sennosides-docusate (PERICOLACE) 8.6-50 MG per tablet 2 tablet, 2 tablet, Oral, BID PRN **AND** polyethylene glycol (MIRALAX) packet 17 g, 17 g, Oral, Daily PRN **AND** bisacodyl (DULCOLAX) EC tablet 5 mg, 5 mg, Oral, Daily PRN **AND** bisacodyl (DULCOLAX) suppository 10 mg, 10 mg, Rectal, Daily PRN, Sarah Cardenas MD    cholecalciferol (VITAMIN D3) tablet 2,000 Units, 2,000 Units, Oral, Daily, Sarah Cardenas MD, 2,000 Units at 08/14/24 0848    citalopram (CeleXA) tablet 20 mg, 20 mg, Oral, Daily, Sarah Cardenas MD, 20 mg at 08/14/24 0848    dextrose (D50W) (25 g/50 mL) IV injection 25 g, 25 g, Intravenous, Q15 Min PRN, Sarah Cardenas MD    dextrose (GLUTOSE) oral gel 15 g, 15 g, Oral, Q15 Min PRN, Sarah Cardenas MD    glucagon (GLUCAGEN) injection 1 mg, 1 mg, Intramuscular, Q15 Min PRN, Sarah Cardenas MD    HYDROcodone-acetaminophen (NORCO) 5-325 MG per tablet 1 tablet, 1 tablet, Oral, Q4H PRN, Chivo Worrell MD    HYDROmorphone (DILAUDID) injection 0.5 mg, 0.5 mg, Intravenous, Q4H PRN,  Sarah Cardenas MD, 0.5 mg at 08/14/24 0741    insulin glargine (LANTUS, SEMGLEE) injection 20 Units, 20 Units, Subcutaneous, Nightly, Sarah Cardenas MD, 20 Units at 08/13/24 2102    insulin lispro (HUMALOG/ADMELOG) injection 2 Units, 2 Units, Subcutaneous, TID With Meals, Chivo Worrell MD, 2 Units at 08/14/24 0848    insulin lispro (HUMALOG/ADMELOG) injection 2-7 Units, 2-7 Units, Subcutaneous, 4x Daily AC & at Bedtime, Sarah Cardenas MD, 3 Units at 08/14/24 0848    lactated ringers infusion, 75 mL/hr, Intravenous, Continuous, Chivo Worrell MD, Last Rate: 125 mL/hr at 08/13/24 2115, 125 mL/hr at 08/13/24 2115    levoFLOXacin (LEVAQUIN) 750 mg/150 mL D5W (premix) (LEVAQUIN) 750 mg, 750 mg, Intravenous, Q24H, Sarah Cardenas MD, Stopped at 08/13/24 1935    lisinopril (PRINIVIL,ZESTRIL) tablet 10 mg, 10 mg, Oral, Q24H, Sarah Cardenas MD, 10 mg at 08/14/24 0848    melatonin tablet 2.5 mg, 2.5 mg, Oral, Nightly PRN, Sarah Cardenas MD    multivitamin (THERAGRAN) tablet 1 tablet, 1 tablet, Oral, Daily, Sarah Cardenas MD, 1 tablet at 08/14/24 0848    ondansetron ODT (ZOFRAN-ODT) disintegrating tablet 4 mg, 4 mg, Oral, Q6H PRN **OR** ondansetron (ZOFRAN) injection 4 mg, 4 mg, Intravenous, Q6H PRN, Sarah Cardenas MD, 4 mg at 08/13/24 0506    pravastatin (PRAVACHOL) tablet 80 mg, 80 mg, Oral, Daily, Sarah Cardenas MD, 80 mg at 08/14/24 0848    sodium chloride 0.9 % flush 10 mL, 10 mL, Intravenous, PRN, Isaiah Calixto MD  Review of Systems:    All systems were reviewed and negative except for:  Gastrointestinal: positive for  diarrhea and pain    Objective     Vital Signs  Temp:  [97.6 °F (36.4 °C)-98.2 °F (36.8 °C)] 97.9 °F (36.6 °C)  Heart Rate:  [] 102  Resp:  [18] 18  BP: ()/(70-87) 138/70  Body mass index is 32.28 kg/m².    Intake/Output Summary (Last 24 hours) at 8/14/2024 1129  Last data filed at 8/14/2024 1104  Gross per  24 hour   Intake --   Output 900 ml   Net -900 ml     I/O this shift:  In: -   Out: 100 [Urine:100]     Physical Exam:   General: patient awake, alert and cooperative   Abdomen: soft, nontender, nondistended; normal bowel sounds      Results Review:     I reviewed the patient's new clinical results.    Results from last 7 days   Lab Units 08/13/24  0504 08/12/24  1347   WBC 10*3/mm3 5.70 6.79   HEMOGLOBIN g/dL 12.2 14.3   HEMATOCRIT % 36.7 44.2   PLATELETS 10*3/mm3 141 168     Results from last 7 days   Lab Units 08/13/24  0504 08/12/24  1347   SODIUM mmol/L 133* 134*   POTASSIUM mmol/L 3.5 3.9   CHLORIDE mmol/L 102 99   CO2 mmol/L 22.0 21.0*   BUN mg/dL 9 9   CREATININE mg/dL 0.63 0.82   CALCIUM mg/dL 8.7 9.9   BILIRUBIN mg/dL  --  0.5   ALK PHOS U/L  --  86   ALT (SGPT) U/L  --  17   AST (SGOT) U/L  --  15   GLUCOSE mg/dL 241* 392*         Lab Results   Lab Value Date/Time    LIPASE 26 08/12/2024 1347       Radiology:  CT Abdomen Pelvis With Contrast   Final Result          Assessment & Plan     Active Hospital Problems    Diagnosis     **Colitis     Gastroesophageal reflux disease without esophagitis     Essential hypertension     Type 2 diabetes mellitus without complication, without long-term current use of insulin        Assessment:  Abdominal pain - slightly improved  Diarrhea  CT A/P with colitis - GI PCR and C. difficile negative  Type 2 diabetes    Plan:  Full liquid diet as tolerated  Add Bentyl as needed for abdominal pain  Continue IV Levaquin  Check CBC, CMP, CRP and sed rate this morning    I discussed the patients findings and my recommendations with patient and Dr. Dorman .    Sarah Patel, APRN

## 2024-08-14 NOTE — PLAN OF CARE
Goal Outcome Evaluation:     Patient A&Ox4. Ad keyla. Complaints of intermittent nausea and abdominal pain, PRN pain meds as needed. Plan of care ongoing.

## 2024-08-14 NOTE — PROGRESS NOTES
Name: Seble Delgadillo ADMIT: 2024   : 1970  PCP: Yelena Guzman APRN    MRN: [id number] LOS: 0 days   AGE/SEX: 54 y.o. female  ROOM: [facility]/   Subjective   Chief Complaint   Patient presents with    Abdominal Pain    Diarrhea     53 yo with history of DM, HTN who presents with diarrhea, abdominal pain. She came to the ER and diagnosed with Colitis on CT scan. She was given levaquin ABX.     Still with abd pain and cramping  Worse with food  On liquid diet  Pain a little better with IV meds  On abx  On IVFs    ROS  No f/c  No n/v  No cp/palp  No soa/cough    Objective   Vital Signs  Temp:  [97.9 °F (36.6 °C)-98.2 °F (36.8 °C)] 98.2 °F (36.8 °C)  Heart Rate:  [] 80  Resp:  [18] 18  BP: ()/(70-87) 116/77  SpO2:  [97 %-99 %] 97 %  on   ;   Device (Oxygen Therapy): room air  Body mass index is 32.28 kg/m².    Physical Exam  Constitutional:       General: She is not in acute distress.  HENT:      Head: Normocephalic and atraumatic.   Eyes:      General: No scleral icterus.  Pulmonary:      Effort: Pulmonary effort is normal. No respiratory distress.   Abdominal:      General: There is no distension.      Palpations: Abdomen is soft.      Tenderness: There is abdominal tenderness (mild generalized). There is no guarding.   Musculoskeletal:      Cervical back: Neck supple.   Neurological:      Mental Status: She is alert.   Psychiatric:         Mood and Affect: Mood normal.         Behavior: Behavior normal.         Thought Content: Thought content normal.         Judgment: Judgment normal.         Results Review:       I reviewed the patient's new clinical results.  Results from last 7 days   Lab Units 24  0504 24  1347   WBC 10*3/mm3 5.70 6.79   HEMOGLOBIN g/dL 12.2 14.3   PLATELETS 10*3/mm3 141 168     Results from last 7 days   Lab Units 24  0504 24  1347   SODIUM mmol/L 133* 134*   POTASSIUM mmol/L 3.5 3.9   CHLORIDE mmol/L 102 99   CO2 mmol/L 22.0 21.0*   BUN  "mg/dL 9 9   CREATININE mg/dL 0.63 0.82   GLUCOSE mg/dL 241* 392*   CrCl cannot be calculated (Unknown ideal weight.).  Results from last 7 days   Lab Units 08/12/24  1347   ALBUMIN g/dL 4.1   BILIRUBIN mg/dL 0.5   ALK PHOS U/L 86   AST (SGOT) U/L 15   ALT (SGPT) U/L 17     Results from last 7 days   Lab Units 08/13/24  0504 08/12/24  1347   CALCIUM mg/dL 8.7 9.9   ALBUMIN g/dL  --  4.1     Results from last 7 days   Lab Units 08/12/24  1701 08/12/24  1347   PROCALCITONIN ng/mL  --  0.06   LACTATE mmol/L 1.7 2.4*       Coag     HbA1C   Lab Results   Component Value Date    HGBA1C 14.2 (H) 02/22/2022    HGBA1C 12.2 11/03/2020    HGBA1C 10.30 (H) 07/23/2020     Infection   Results from last 7 days   Lab Units 08/13/24  0034 08/13/24  0027 08/12/24  1701 08/12/24  1633 08/12/24  1347   BLOODCX  No growth at 24 hours No growth at 24 hours No growth at 24 hours No growth at 24 hours  --    PROCALCITONIN ng/mL  --   --   --   --  0.06     Radiology(recent) No radiology results for the last day  No results found for: \"TROPONINT\", \"TROPONINI\", \"BNP\"  No components found for: \"TSH;2\"    cholecalciferol, 2,000 Units, Oral, Daily  citalopram, 20 mg, Oral, Daily  insulin glargine, 20 Units, Subcutaneous, Nightly  insulin lispro, 2 Units, Subcutaneous, TID With Meals  insulin lispro, 2-7 Units, Subcutaneous, 4x Daily AC & at Bedtime  levoFLOXacin, 750 mg, Intravenous, Q24H  lisinopril, 10 mg, Oral, Q24H  multivitamin, 1 tablet, Oral, Daily  pravastatin, 80 mg, Oral, Daily      lactated ringers, 75 mL/hr, Last Rate: 75 mL/hr (08/14/24 1207)    Diet: Liquid; Full Liquid; Fluid Consistency: Thin (IDDSI 0)      Assessment & Plan      Active Hospital Problems    Diagnosis  POA    **Colitis [K52.9]  Yes    Gastroesophageal reflux disease without esophagitis [K21.9]  Yes    Essential hypertension [I10]  Yes    Type 2 diabetes mellitus without complication, without long-term current use of insulin [E11.9]  Yes      Resolved Hospital " Problems   No resolved problems to display.     55 yo with history of DM, HTN who presents with diarrhea, abdominal pain. She came to the ER and diagnosed with Colitis on CT scan. She was given levaquin ABX. Cdiff and stool PCR are negative.     On levquin. Decreased diet down to liquid as had pain with regular. PRN agents for nausea/pain. GI is following  IVFs  Insulin, monitor BG. Currently 210-222 will increase insulin, monitor closely, high risk medication  Agents for HTN, monitor BP      HAY RN  Reviewed records    Chivo Worrell MD  [facility]  08/14/24  10:42 EDT

## 2024-08-15 VITALS
DIASTOLIC BLOOD PRESSURE: 77 MMHG | BODY MASS INDEX: 32.28 KG/M2 | HEIGHT: 66 IN | HEART RATE: 78 BPM | SYSTOLIC BLOOD PRESSURE: 132 MMHG | OXYGEN SATURATION: 98 % | RESPIRATION RATE: 16 BRPM | TEMPERATURE: 97.9 F

## 2024-08-15 LAB
ALBUMIN SERPL-MCNC: 3.2 G/DL (ref 3.5–5.2)
ALBUMIN/GLOB SERPL: 1.1 G/DL
ALP SERPL-CCNC: 67 U/L (ref 39–117)
ALT SERPL W P-5'-P-CCNC: 21 U/L (ref 1–33)
ANION GAP SERPL CALCULATED.3IONS-SCNC: 9.1 MMOL/L (ref 5–15)
AST SERPL-CCNC: 30 U/L (ref 1–32)
BASOPHILS # BLD AUTO: 0.02 10*3/MM3 (ref 0–0.2)
BASOPHILS NFR BLD AUTO: 0.5 % (ref 0–1.5)
BILIRUB SERPL-MCNC: 0.4 MG/DL (ref 0–1.2)
BUN SERPL-MCNC: 5 MG/DL (ref 6–20)
BUN/CREAT SERPL: 8.3 (ref 7–25)
CALCIUM SPEC-SCNC: 9.2 MG/DL (ref 8.6–10.5)
CHLORIDE SERPL-SCNC: 103 MMOL/L (ref 98–107)
CO2 SERPL-SCNC: 24.9 MMOL/L (ref 22–29)
CREAT SERPL-MCNC: 0.6 MG/DL (ref 0.57–1)
DEPRECATED RDW RBC AUTO: 43.6 FL (ref 37–54)
EGFRCR SERPLBLD CKD-EPI 2021: 105.5 ML/MIN/1.73
EOSINOPHIL # BLD AUTO: 0.33 10*3/MM3 (ref 0–0.4)
EOSINOPHIL NFR BLD AUTO: 8.2 % (ref 0.3–6.2)
ERYTHROCYTE [DISTWIDTH] IN BLOOD BY AUTOMATED COUNT: 12.6 % (ref 12.3–15.4)
GLOBULIN UR ELPH-MCNC: 2.8 GM/DL
GLUCOSE BLDC GLUCOMTR-MCNC: 194 MG/DL (ref 70–130)
GLUCOSE BLDC GLUCOMTR-MCNC: 205 MG/DL (ref 70–130)
GLUCOSE BLDC GLUCOMTR-MCNC: 219 MG/DL (ref 70–130)
GLUCOSE BLDC GLUCOMTR-MCNC: 265 MG/DL (ref 70–130)
GLUCOSE SERPL-MCNC: 230 MG/DL (ref 65–99)
HCT VFR BLD AUTO: 35.9 % (ref 34–46.6)
HGB BLD-MCNC: 11.6 G/DL (ref 12–15.9)
IMM GRANULOCYTES # BLD AUTO: 0.01 10*3/MM3 (ref 0–0.05)
IMM GRANULOCYTES NFR BLD AUTO: 0.2 % (ref 0–0.5)
LYMPHOCYTES # BLD AUTO: 1.49 10*3/MM3 (ref 0.7–3.1)
LYMPHOCYTES NFR BLD AUTO: 37 % (ref 19.6–45.3)
MCH RBC QN AUTO: 30.6 PG (ref 26.6–33)
MCHC RBC AUTO-ENTMCNC: 32.3 G/DL (ref 31.5–35.7)
MCV RBC AUTO: 94.7 FL (ref 79–97)
MONOCYTES # BLD AUTO: 0.45 10*3/MM3 (ref 0.1–0.9)
MONOCYTES NFR BLD AUTO: 11.2 % (ref 5–12)
NEUTROPHILS NFR BLD AUTO: 1.73 10*3/MM3 (ref 1.7–7)
NEUTROPHILS NFR BLD AUTO: 42.9 % (ref 42.7–76)
NRBC BLD AUTO-RTO: 0 /100 WBC (ref 0–0.2)
PLATELET # BLD AUTO: 122 10*3/MM3 (ref 140–450)
PMV BLD AUTO: 9.2 FL (ref 6–12)
POTASSIUM SERPL-SCNC: 3.8 MMOL/L (ref 3.5–5.2)
PROT SERPL-MCNC: 6 G/DL (ref 6–8.5)
RBC # BLD AUTO: 3.79 10*6/MM3 (ref 3.77–5.28)
SODIUM SERPL-SCNC: 137 MMOL/L (ref 136–145)
WBC NRBC COR # BLD AUTO: 4.03 10*3/MM3 (ref 3.4–10.8)

## 2024-08-15 PROCEDURE — 80053 COMPREHEN METABOLIC PANEL: CPT | Performed by: NURSE PRACTITIONER

## 2024-08-15 PROCEDURE — 82948 REAGENT STRIP/BLOOD GLUCOSE: CPT

## 2024-08-15 PROCEDURE — 99214 OFFICE O/P EST MOD 30 MIN: CPT | Performed by: NURSE PRACTITIONER

## 2024-08-15 PROCEDURE — 63710000001 INSULIN LISPRO (HUMAN) PER 5 UNITS: Performed by: INTERNAL MEDICINE

## 2024-08-15 PROCEDURE — 85025 COMPLETE CBC W/AUTO DIFF WBC: CPT | Performed by: NURSE PRACTITIONER

## 2024-08-15 PROCEDURE — G0378 HOSPITAL OBSERVATION PER HR: HCPCS

## 2024-08-15 RX ORDER — CITALOPRAM 20 MG/1
40 TABLET ORAL DAILY
Start: 2024-08-15

## 2024-08-15 RX ORDER — LEVOFLOXACIN 750 MG/1
750 TABLET, FILM COATED ORAL DAILY
Qty: 3 TABLET | Refills: 0 | Status: SHIPPED | OUTPATIENT
Start: 2024-08-15 | End: 2024-08-18

## 2024-08-15 RX ORDER — LOPERAMIDE HYDROCHLORIDE 2 MG/1
2 CAPSULE ORAL ONCE
Status: COMPLETED | OUTPATIENT
Start: 2024-08-15 | End: 2024-08-15

## 2024-08-15 RX ORDER — ACETAMINOPHEN 325 MG/1
650 TABLET ORAL EVERY 4 HOURS PRN
Start: 2024-08-15

## 2024-08-15 RX ORDER — DICYCLOMINE HYDROCHLORIDE 10 MG/1
10 CAPSULE ORAL 4 TIMES DAILY PRN
Qty: 30 CAPSULE | Refills: 0 | Status: SHIPPED | OUTPATIENT
Start: 2024-08-15

## 2024-08-15 RX ORDER — LOPERAMIDE HYDROCHLORIDE 2 MG/1
2 CAPSULE ORAL 3 TIMES DAILY PRN
Status: DISCONTINUED | OUTPATIENT
Start: 2024-08-15 | End: 2024-08-15 | Stop reason: HOSPADM

## 2024-08-15 RX ORDER — ONDANSETRON 4 MG/1
4 TABLET, ORALLY DISINTEGRATING ORAL EVERY 6 HOURS PRN
Qty: 10 TABLET | Refills: 0 | Status: SHIPPED | OUTPATIENT
Start: 2024-08-15

## 2024-08-15 RX ADMIN — Medication 1 TABLET: at 08:08

## 2024-08-15 RX ADMIN — INSULIN LISPRO 2 UNITS: 100 INJECTION, SOLUTION INTRAVENOUS; SUBCUTANEOUS at 12:51

## 2024-08-15 RX ADMIN — INSULIN LISPRO 3 UNITS: 100 INJECTION, SOLUTION INTRAVENOUS; SUBCUTANEOUS at 08:08

## 2024-08-15 RX ADMIN — LOPERAMIDE HYDROCHLORIDE 2 MG: 2 CAPSULE ORAL at 05:51

## 2024-08-15 RX ADMIN — LISINOPRIL 10 MG: 10 TABLET ORAL at 08:08

## 2024-08-15 RX ADMIN — PRAVASTATIN SODIUM 80 MG: 40 TABLET ORAL at 08:08

## 2024-08-15 RX ADMIN — INSULIN LISPRO 2 UNITS: 100 INJECTION, SOLUTION INTRAVENOUS; SUBCUTANEOUS at 08:08

## 2024-08-15 RX ADMIN — CITALOPRAM 20 MG: 20 TABLET, FILM COATED ORAL at 08:08

## 2024-08-15 RX ADMIN — Medication 2000 UNITS: at 08:08

## 2024-08-15 NOTE — PLAN OF CARE
Goal Outcome Evaluation:  Plan of Care Reviewed With: patient      Patient sleeping on and off.Abd pain slowly improving.Still having several loose stools, one dose of imodium.Continues w IVFs.No acute distress

## 2024-08-15 NOTE — DISCHARGE SUMMARY
NAME: Seble Delgadillo ADMIT: 2024   : 1970  PCP: Yelena Guzman APRN    MRN: [id number] LOS: 0 days   AGE/SEX: 54 y.o. female  ROOM: E657/1     Date of Admission:  2024  Date of Discharge:  8/15/2024    PCP: Yelena Guzman APRN    CHIEF COMPLAINT  Abdominal Pain and Diarrhea      DISCHARGE DIAGNOSIS  Active Hospital Problems    Diagnosis  POA    **Colitis [K52.9]  Yes    Gastroesophageal reflux disease without esophagitis [K21.9]  Yes    Essential hypertension [I10]  Yes    Type 2 diabetes mellitus without complication, without long-term current use of insulin [E11.9]  Yes      Resolved Hospital Problems   No resolved problems to display.       SECONDARY DIAGNOSES  Past Medical History:   Diagnosis Date    Depression     Diabetes mellitus     Hyperlipidemia        CONSULTS   GI    HOSPITAL COURSE  53 yo with history of DM, HTN who presents with diarrhea, abdominal pain. She came to the ER and diagnosed with Colitis on CT scan. She was given levaquin ABX. Cdiff and stool PCR are negative.     She improved clinically and is tolerating diet and wishes for discharge today with outpatient follow up.           DIAGNOSTICS     CT Abdomen Pelvis With Contrast [[id number]] Augustine as Reviewed   Order Status: Completed Collected: 24 1610    Updated: 24 0749   Narrative:     CT ABDOMEN AND PELVIS WITH IV CONTRAST     HISTORY: 54-year-old female with lower abdominal pain. Hysterectomy in  the past.     TECHNIQUE: Radiation dose reduction techniques were utilized, including  automated exposure control and exposure modulation based on body size.  3 mm images were obtained through the abdomen and pelvis after the  administration of IV contrast. No previous CT for comparison.     FINDINGS:  1. There is extensive colitis involving a long segment of sigmoid colon  and rectum. Extensive thickening and surrounding inflammatory  change/edema. The appearance can be seen with pseudomembranous  colitis  and an extensive infectious colitis. There is very small volume of free  fluid, but there is no fluid collection or abscess. The colon appears  otherwise unremarkable. There is secondary mild thickening of an  adjacent segment of small bowel, series 2 image 131.  There is no appendicitis. No bowel ileus or obstruction.     2. There is cholelithiasis without cholecystitis and there is no biliary  dilatation. The liver, pancreas, spleen, adrenals, and kidneys appear  unremarkable. Uterus and ovaries are surgically absent.  Urinary bladder is significantly thick walled, but is partially  collapsed. Please correlate clinically for acute UTI/cystitis.     3. At the visualized lower chest, there are very ill-defined patchy  groundglass opacities at the right lung base, and very faint groundglass  density at the left lung base. Pulmonary vasculature is prominent. This  may possibly represent mild edema or mild atypical pneumonia.        Collected Updated Procedure Result Status    08/15/2024 0417 08/15/2024 0518 Comprehensive Metabolic Panel [[id number]]    (Abnormal)   Blood from Arm, Right    Final result Component Value Units   Glucose 230 High  mg/dL   BUN 5 Low  mg/dL   Creatinine 0.60 mg/dL   Sodium 137 mmol/L   Potassium 3.8 mmol/L   Chloride 103 mmol/L   CO2 24.9 mmol/L   Calcium 9.2 mg/dL   Total Protein 6.0 g/dL   Albumin 3.2 Low  g/dL   ALT (SGPT) 21 U/L   AST (SGOT) 30 U/L   Alkaline Phosphatase 67 U/L   Total Bilirubin 0.4 mg/dL   Globulin 2.8 gm/dL   A/G Ratio 1.1 g/dL   BUN/Creatinine Ratio 8.3    Anion Gap 9.1 mmol/L   eGFR 105.5 mL/min/1.73          08/15/2024 0417 08/15/2024 0527 CBC Auto Differential [[id number]]   (Abnormal)   Blood from Arm, Right    Final result Component Value Units   WBC 4.03 10*3/mm3   RBC 3.79 10*6/mm3   Hemoglobin 11.6 Low  g/dL   Hematocrit 35.9 %   MCV 94.7 fL   MCH 30.6 pg   MCHC 32.3 g/dL   RDW 12.6 %   RDW-SD 43.6 fl   MPV 9.2 fL   Platelets 122 Low  10*3/mm3  "  Neutrophil % 42.9 %   Lymphocyte % 37.0 %   Monocyte % 11.2 %   Eosinophil % 8.2 High  %   Basophil % 0.5 %   Immature Grans % 0.2 %   Neutrophils, Absolute 1.73 10*3/mm3   Lymphocytes, Absolute 1.49 10*3/mm3          PHYSICAL EXAM  Objective:  Vital signs: (most recent): Blood pressure 141/80, pulse 76, temperature 97.8 °F (36.6 °C), temperature source Oral, resp. rate 16, height 167.6 cm (66\"), SpO2 99%.                Alert  nad  No resp distress  Soft, nt      CONDITION ON DISCHARGE  Stable.      DISCHARGE DISPOSITION   Home or Self Care      DISCHARGE MEDICATIONS       Your medication list        START taking these medications        Instructions Last Dose Given Next Dose Due   acetaminophen 325 MG tablet  Commonly known as: TYLENOL      Take 2 tablets by mouth Every 4 (Four) Hours As Needed for Mild Pain.       dicyclomine 10 MG capsule  Commonly known as: BENTYL      Take 1 capsule by mouth 4 (Four) Times a Day As Needed (Abdominal cramping).       levoFLOXacin 750 MG tablet  Commonly known as: Levaquin      Take 1 tablet by mouth Daily for 3 days.       ondansetron ODT 4 MG disintegrating tablet  Commonly known as: ZOFRAN-ODT      Take 1 tablet by mouth Every 6 (Six) Hours As Needed for Nausea or Vomiting.              CHANGE how you take these medications        Instructions Last Dose Given Next Dose Due   citalopram 20 MG tablet  Commonly known as: CeleXA  What changed: how much to take      Take 2 tablets by mouth Daily.              CONTINUE taking these medications        Instructions Last Dose Given Next Dose Due   aspirin 81 MG EC tablet      Take 1 tablet by mouth Daily.       BD Pen Needle Magaly 2nd Gen 32G X 4 MM misc  Generic drug: Insulin Pen Needle           cholecalciferol 25 MCG (1000 UT) tablet  Commonly known as: VITAMIN D3      TAKE 2 TABLETS BY MOUTH EVERY DAY       fluticasone 50 MCG/ACT nasal spray  Commonly known as: Flonase      2 sprays into the nostril(s) as directed by provider " Daily.       glucosamine-chondroitin 500-400 MG capsule capsule      Take  by mouth Daily.       Insulin Glargine 100 UNIT/ML injection pen  Commonly known as: Lantus SoloStar      INJECT 20 UNITS AT NIGHT       Januvia 100 MG tablet  Generic drug: SITagliptin      TAKE 1 TABLET BY MOUTH EVERY DAY       metFORMIN 1000 MG tablet  Commonly known as: GLUCOPHAGE      Take 1 tablet by mouth 2 (Two) Times a Day With Meals.       Mucinex 600 MG 12 hr tablet  Generic drug: guaiFENesin      Take  by mouth.       multivitamin tablet tablet  Commonly known as: THERAGRAN      Take  by mouth Daily.       omeprazole 40 MG capsule  Commonly known as: PrilOSEC      Take 1 capsule by mouth Daily.       pravastatin 80 MG tablet  Commonly known as: PRAVACHOL      Take 1 tablet by mouth Daily.       ramipril 2.5 MG capsule  Commonly known as: ALTACE      TAKE 1 CAPSULE BY MOUTH EVERY DAY              STOP taking these medications      alendronate 70 MG tablet  Commonly known as: FOSAMAX        Calcium Carbonate 1500 (600 Ca) MG tablet        Claritin 10 MG tablet  Generic drug: loratadine        doxepin 10 MG capsule  Commonly known as: SINEquan        empagliflozin 25 MG tablet tablet  Commonly known as: JARDIANCE        HYDROcodone-acetaminophen 7.5-325 MG per tablet  Commonly known as: NORCO                  Where to Get Your Medications        These medications were sent to [place] [address]      Hours: Monday to Friday 7 AM to 6 PM, Saturday & Sunday 8 AM to 4:30 PM (Closed 12 PM to 12:30 PM) Phone: [phone]   dicyclomine 10 MG capsule  levoFLOXacin 750 MG tablet  ondansetron ODT 4 MG disintegrating tablet       Information about where to get these medications is not yet available    Ask your nurse or doctor about these medications  acetaminophen 325 MG tablet  citalopram 20 MG tablet        No future appointments.  Additional Instructions for the Follow-ups that You  Need to Schedule       Discharge Follow-up with Specialty: PCP in 1-2 weeks, GI in 4 weeks   As directed      Specialty: PCP in 1-2 weeks, GI in 4 weeks               Follow-up Information       Yelena Guzman APRN .    Specialty: Nurse Practitioner  Contact information:  [address]  [facility] [address]  [place] [address]  [phone]                             TEST  RESULTS PENDING AT DISCHARGE  Pending Labs       Order Current Status    Blood Culture - Blood, Arm, Left Preliminary result    Blood Culture - Blood, Arm, Right Preliminary result    Blood Culture - Blood, Arm, Right Preliminary result    Blood Culture - Blood, Hand, Left Preliminary result               Chivo Worrell MD  [facility]  08/15/24  14:20 EDT      Time: greater than 32 minutes on discharge  It was a pleasure taking care of this patient while in the hospital.

## 2024-08-15 NOTE — PROGRESS NOTES
[facility], [place], operated by [facility]  Inpatient Progress Note    Reason for Follow Up: Diarrhea and abdominal pain    Subjective     Interval History:     No acute events overnight.  Abdominal pain has resolved.  No rectal bleeding.  She continues to have diarrhea but is responding to as needed Imodium.  Dietary advancement today to diabetic diet which she seems to be tolerating well.      Current Facility-Administered Medications:     acetaminophen (TYLENOL) tablet 650 mg, 650 mg, Oral, Q4H PRN, 650 mg at 08/13/24 2115 **OR** acetaminophen (TYLENOL) 160 MG/5ML oral solution 650 mg, 650 mg, Oral, Q4H PRN **OR** acetaminophen (TYLENOL) suppository 650 mg, 650 mg, Rectal, Q4H PRN, Sarah Cardenas MD    sennosides-docusate (PERICOLACE) 8.6-50 MG per tablet 2 tablet, 2 tablet, Oral, BID PRN **AND** polyethylene glycol (MIRALAX) packet 17 g, 17 g, Oral, Daily PRN **AND** bisacodyl (DULCOLAX) EC tablet 5 mg, 5 mg, Oral, Daily PRN **AND** bisacodyl (DULCOLAX) suppository 10 mg, 10 mg, Rectal, Daily PRN, Sarah Cardenas MD    cholecalciferol (VITAMIN D3) tablet 2,000 Units, 2,000 Units, Oral, Daily, Sarah Cardenas MD, 2,000 Units at 08/15/24 0808    citalopram (CeleXA) tablet 20 mg, 20 mg, Oral, Daily, Sarah Cardenas MD, 20 mg at 08/15/24 0808    dextrose (D50W) (25 g/50 mL) IV injection 25 g, 25 g, Intravenous, Q15 Min PRN, Sarah Cardenas MD    dextrose (GLUTOSE) oral gel 15 g, 15 g, Oral, Q15 Min PRNTheresa Renate Andrea, MD    dicyclomine (BENTYL) capsule 10 mg, 10 mg, Oral, 4x Daily PRN, Sarah Patel APRN    glucagon (GLUCAGEN) injection 1 mg, 1 mg, Intramuscular, Q15 Min PRN, Sarah Cardenas MD    HYDROcodone-acetaminophen (NORCO) 5-325 MG per tablet 1 tablet, 1 tablet, Oral, Q4H PRN, Chivo Worrell MD    HYDROmorphone (DILAUDID) injection 0.5 mg, 0.5 mg, Intravenous, Q4H PRN, Stingl, Sarah Field MD, 0.5 mg at 08/14/24 1309    insulin glargine (LANTUS, SEMGLEE)  injection 23 Units, 23 Units, Subcutaneous, Nightly, Chivo Worrell MD, 23 Units at 08/14/24 2106    insulin lispro (HUMALOG/ADMELOG) injection 2 Units, 2 Units, Subcutaneous, TID With Meals, Chivo Worrell MD, 2 Units at 08/15/24 0808    insulin lispro (HUMALOG/ADMELOG) injection 2-7 Units, 2-7 Units, Subcutaneous, 4x Daily AC & at Bedtime, Sarah Cardenas MD, 3 Units at 08/15/24 0808    lactated ringers infusion, 75 mL/hr, Intravenous, Continuous, Chivo Worrell MD, Last Rate: 75 mL/hr at 08/14/24 2253, 75 mL/hr at 08/14/24 2253    levoFLOXacin (LEVAQUIN) 750 mg/150 mL D5W (premix) (LEVAQUIN) 750 mg, 750 mg, Intravenous, Q24H, Sarah Cardenas MD, Last Rate: 100 mL/hr at 08/14/24 1808, 750 mg at 08/14/24 1808    lisinopril (PRINIVIL,ZESTRIL) tablet 10 mg, 10 mg, Oral, Q24H, Sarah Cardenas MD, 10 mg at 08/15/24 0808    melatonin tablet 2.5 mg, 2.5 mg, Oral, Nightly PRN, Sarah Cardenas MD    multivitamin (THERAGRAN) tablet 1 tablet, 1 tablet, Oral, Daily, Sarah Cardenas MD, 1 tablet at 08/15/24 0808    ondansetron ODT (ZOFRAN-ODT) disintegrating tablet 4 mg, 4 mg, Oral, Q6H PRN **OR** ondansetron (ZOFRAN) injection 4 mg, 4 mg, Intravenous, Q6H PRN, Sarah Cardenas MD, 4 mg at 08/13/24 0506    pravastatin (PRAVACHOL) tablet 80 mg, 80 mg, Oral, Daily, Sarah Cardenas MD, 80 mg at 08/15/24 0808    sodium chloride 0.9 % flush 10 mL, 10 mL, Intravenous, PRN, Isaiah Calixto MD  Review of Systems:    All systems were reviewed and negative except for:  Gastrointestinal: positive for  diarrhea    Objective     Vital Signs  Temp:  [97.7 °F (36.5 °C)-98.2 °F (36.8 °C)] 97.8 °F (36.6 °C)  Heart Rate:  [73-80] 76  Resp:  [16-18] 16  BP: (116-165)/(67-91) 141/80  Body mass index is 32.28 kg/m².    Intake/Output Summary (Last 24 hours) at 8/15/2024 1046  Last data filed at 8/15/2024 0545  Gross per 24 hour   Intake --   Output 1950 ml   Net -1950 ml     No  intake/output data recorded.     Physical Exam:   General: patient awake, alert and cooperative   Abdomen: soft, nontender, nondistended; normal bowel sounds      Results Review:     I reviewed the patient's new clinical results.    Results from last 7 days   Lab Units 08/15/24  0417 08/14/24  1314 08/13/24  0504   WBC 10*3/mm3 4.03 5.09 5.70   HEMOGLOBIN g/dL 11.6* 12.0 12.2   HEMATOCRIT % 35.9 36.1 36.7   PLATELETS 10*3/mm3 122* 154 141     Results from last 7 days   Lab Units 08/15/24  0417 08/14/24  1314 08/13/24  0504 08/12/24  1347   SODIUM mmol/L 137 137 133* 134*   POTASSIUM mmol/L 3.8 3.1* 3.5 3.9   CHLORIDE mmol/L 103 103 102 99   CO2 mmol/L 24.9 25.7 22.0 21.0*   BUN mg/dL 5* 6 9 9   CREATININE mg/dL 0.60 0.65 0.63 0.82   CALCIUM mg/dL 9.2 9.6 8.7 9.9   BILIRUBIN mg/dL 0.4 0.5  --  0.5   ALK PHOS U/L 67 70  --  86   ALT (SGPT) U/L 21 17  --  17   AST (SGOT) U/L 30 22  --  15   GLUCOSE mg/dL 230* 146* 241* 392*         Lab Results   Lab Value Date/Time    LIPASE 26 08/12/2024 1347       Radiology:  CT Abdomen Pelvis With Contrast   Final Result          Assessment & Plan     Active Hospital Problems    Diagnosis     **Colitis     Gastroesophageal reflux disease without esophagitis     Essential hypertension     Type 2 diabetes mellitus without complication, without long-term current use of insulin        Assessment:  Abdominal pain - resolved  Diarrhea  CT A/P with colitis - GI PCR and C. difficile negative  Type 2 diabetes    Plan:  Continue IV Levaquin  Diet as tolerated  Bentyl as needed for abdominal pain  Imodium as needed for diarrhea  CBC and CMP in the morning  Potential discharge home tomorrow    I discussed the patients findings and my recommendations with patient.    Sarah Patel, APRN

## 2024-08-16 NOTE — CASE MANAGEMENT/SOCIAL WORK
Case Management Discharge Note      Final Note: Pt discharged home with family on 8/15.   KEV Pope RN         Selected Continued Care - Discharged on 8/15/2024 Admission date: 8/12/2024 - Discharge disposition: Home or Self Care      Destination    No services have been selected for the patient.                Durable Medical Equipment    No services have been selected for the patient.                Dialysis/Infusion    No services have been selected for the patient.                Home Medical Care    No services have been selected for the patient.                Therapy    No services have been selected for the patient.                Community Resources    No services have been selected for the patient.                Community & DME    No services have been selected for the patient.                    Transportation Services  Private: Car    Final Discharge Disposition Code: 01 - home or self-care

## 2024-08-17 LAB
BACTERIA SPEC AEROBE CULT: NORMAL
BACTERIA SPEC AEROBE CULT: NORMAL

## 2024-08-18 LAB
BACTERIA SPEC AEROBE CULT: NORMAL
BACTERIA SPEC AEROBE CULT: NORMAL

## 2025-05-20 ENCOUNTER — OFFICE VISIT (OUTPATIENT)
Dept: GASTROENTEROLOGY | Facility: CLINIC | Age: 55
End: 2025-05-20
Payer: COMMERCIAL

## 2025-05-20 ENCOUNTER — TELEPHONE (OUTPATIENT)
Dept: GASTROENTEROLOGY | Facility: CLINIC | Age: 55
End: 2025-05-20
Payer: COMMERCIAL

## 2025-05-20 VITALS
WEIGHT: 176.8 LBS | BODY MASS INDEX: 28.42 KG/M2 | DIASTOLIC BLOOD PRESSURE: 70 MMHG | SYSTOLIC BLOOD PRESSURE: 104 MMHG | HEART RATE: 94 BPM | HEIGHT: 66 IN | TEMPERATURE: 97.1 F

## 2025-05-20 DIAGNOSIS — R11.0 NAUSEA: ICD-10-CM

## 2025-05-20 DIAGNOSIS — R10.13 DYSPEPSIA: ICD-10-CM

## 2025-05-20 DIAGNOSIS — R10.33 PERIUMBILICAL PAIN: Primary | ICD-10-CM

## 2025-05-20 DIAGNOSIS — K59.00 CONSTIPATION, UNSPECIFIED CONSTIPATION TYPE: ICD-10-CM

## 2025-05-20 DIAGNOSIS — Z87.19 HISTORY OF COLITIS: ICD-10-CM

## 2025-05-20 PROCEDURE — 99214 OFFICE O/P EST MOD 30 MIN: CPT | Performed by: NURSE PRACTITIONER

## 2025-05-20 RX ORDER — SUCRALFATE 1 G/1
1 TABLET ORAL
COMMUNITY
Start: 2025-04-25 | End: 2025-10-22

## 2025-05-20 RX ORDER — ERGOCALCIFEROL 1.25 MG/1
CAPSULE, LIQUID FILLED ORAL
COMMUNITY

## 2025-05-20 RX ORDER — CETIRIZINE HYDROCHLORIDE 10 MG/1
CAPSULE, LIQUID FILLED ORAL
COMMUNITY

## 2025-05-20 RX ORDER — PIOGLITAZONE 15 MG/1
15 TABLET ORAL DAILY
COMMUNITY

## 2025-05-20 RX ORDER — FAMOTIDINE 40 MG/1
40 TABLET, FILM COATED ORAL
COMMUNITY
Start: 2024-08-23 | End: 2025-05-20

## 2025-05-20 RX ORDER — ROSUVASTATIN CALCIUM 40 MG/1
40 TABLET, COATED ORAL DAILY
COMMUNITY
Start: 2025-04-25

## 2025-05-20 RX ORDER — PANTOPRAZOLE SODIUM 40 MG/1
40 TABLET, DELAYED RELEASE ORAL DAILY
Qty: 90 TABLET | Refills: 3 | Status: SHIPPED | OUTPATIENT
Start: 2025-05-20

## 2025-05-20 RX ORDER — EMPAGLIFLOZIN AND METFORMIN HYDROCHLORIDE 5; 1000 MG/1; MG/1
TABLET ORAL
COMMUNITY

## 2025-05-20 NOTE — TELEPHONE ENCOUNTER
1. Visit for preventive health examination  ***  - CBC with Automated Differential  - Comprehensive Metabolic Panel  - Free T4  - Glycohemoglobin  - Lipid Panel With Reflex  - Thyroid Stimulating Hormone  - Urinalysis With Microscopy Exam W/O C/S    2. Need for Tdap vaccination  ***  - TETANUS DIPHTHERIA ACELLULAR PERTUSSIS VACC, 10+ YRS (BOOSTRIX)  - Chlamydia/Gonorrhea by Nucleic Acid Amplification    3. Testicular pain, left  ***  - SERVICE TO UROLOGY    4. Dysuria  ***  - SERVICE TO UROLOGY     MATT tejada for COLONOSCOPY on 8/21/25  arrive at 11:30  . Gave prep instructions to pt in office....miralax

## 2025-05-20 NOTE — PROGRESS NOTES
Chief Complaint   Patient presents with    Abdominal Pain       HPI    Seble Delgadillo is a  55 y.o. female here for a follow up visit for abdominal pain.    This patient is known to me and will also follow with Dr. Figueroa.    Past medical history of diabetes, depression and hyperlipidemia.    She was last seen by myself while hospitalized at [facility] in August 2020 for.  We were asked to see the patient for abdominal pain and diarrhea.  She had evidence of colitis on CT.  Stool testing was negative.  She was treated empirically with Levaquin and Flagyl.  Treated conservatively and symptoms resolved.  Update colonoscopy 8 weeks postdischarge however patient is just now following up.    On visit today she reports continued abdominal pain localizes to the periumbilical area, comes and goes described as a cramping aching sensation.  She started fiber in the form of Benefiber recently and her bowel pattern has improved.  Historically prone to constipation with a bowel movement every 3 days now having a bowel movement daily with feelings of evacuation.  No rectal bleeding or rectal pain.    She complains of nausea without vomiting.  Frequent dyspepsia without dysphagia or odynophagia.  She started Carafate in May which she found helpful.  She is taking Pepcid 40 mg once daily.    CT abdomen pelvis with contrast 3/2025 - cystitis.  Normal postoperative changes from prior hysterectomy.  Nodules in the lung bases.  Cholelithiasis without cholecystitis.    Last colonoscopy was 2018 (reviewed) normal findings with poor prep.   Recall 5 years.     Past Medical History:   Diagnosis Date    Depression     Diabetes mellitus     Hyperlipidemia        Past Surgical History:   Procedure Laterality Date    HYSTERECTOMY  2002    TONSILLECTOMY  2010       Scheduled Meds:     Continuous Infusions: No current facility-administered medications for this visit.      PRN Meds:     Allergies   Allergen Reactions    Bupropion Other (See Comments)      Caused suicidal thoughts     Oxycodone-Acetaminophen Itching    Buspirone Other (See Comments)       Social History     Socioeconomic History    Marital status:    Tobacco Use    Smoking status: Never     Passive exposure: Current    Smokeless tobacco: Never   Vaping Use    Vaping status: Never Used   Substance and Sexual Activity    Alcohol use: Yes     Comment: rarely    Drug use: No    Sexual activity: Defer       Family History   Problem Relation Age of Onset    Other Mother         non hodgkins lymphoma     Diabetes Mother     Diabetes Father     Other Sister         prediabetes    Diabetes Maternal Grandmother     Diabetes Paternal Grandmother     Prostate cancer Paternal Grandfather        Vitals:    05/20/25 1330   BP: 104/70   Pulse: 94   Temp: 97.1 °F (36.2 °C)       Physical Exam  Constitutional:       Appearance: She is well-developed.   Abdominal:      General: Bowel sounds are normal. There is no distension.      Palpations: Abdomen is soft. There is no mass.      Tenderness: There is no abdominal tenderness. There is no guarding.      Hernia: No hernia is present.   Skin:     General: Skin is warm and dry.      Capillary Refill: Capillary refill takes less than 2 seconds.   Neurological:      Mental Status: She is alert and oriented to person, place, and time.   Psychiatric:         Behavior: Behavior normal.       Assessment    Diagnoses and all orders for this visit:    1. Periumbilical pain (Primary)  -     Case Request; Standing  -     Implement Anesthesia orders day of procedure.; Standing  -     Follow Anesthesia Guidelines / Protocol; Future  -     Verify bowel prep was successful; Standing  -     Give tap water enema if bowel prep was insufficient; Standing  -     Obtain Informed Consent; Standing  -     Case Request  -     CBC (No Diff)  -     Comprehensive Metabolic Panel  -     TSH  -     Celiac Comprehensive Panel    2. Nausea  -     Case Request; Standing  -     Implement  Anesthesia orders day of procedure.; Standing  -     Follow Anesthesia Guidelines / Protocol; Future  -     Verify bowel prep was successful; Standing  -     Give tap water enema if bowel prep was insufficient; Standing  -     Obtain Informed Consent; Standing  -     Case Request  -     CBC (No Diff)  -     Comprehensive Metabolic Panel  -     TSH  -     Celiac Comprehensive Panel    3. Dyspepsia  -     Case Request; Standing  -     Implement Anesthesia orders day of procedure.; Standing  -     Follow Anesthesia Guidelines / Protocol; Future  -     Verify bowel prep was successful; Standing  -     Give tap water enema if bowel prep was insufficient; Standing  -     Obtain Informed Consent; Standing  -     Case Request    4. History of colitis  -     Case Request; Standing  -     Implement Anesthesia orders day of procedure.; Standing  -     Follow Anesthesia Guidelines / Protocol; Future  -     Verify bowel prep was successful; Standing  -     Give tap water enema if bowel prep was insufficient; Standing  -     Obtain Informed Consent; Standing  -     Case Request    5. Constipation, unspecified constipation type  -     TSH    Other orders  -     pantoprazole (PROTONIX) 40 MG EC tablet; Take 1 tablet by mouth Daily.  Dispense: 90 tablet; Refill: 3    Plan    Recommend bidirectional endoscopic evaluation with Dr. Figueroa  Risk-benefit of procedure reviewed with the patient  Stop Pepcid  Begin Protonix 40 mg once daily monitor for symptom improvement  Continue Carafate  Continue fiber supplement  Recommend GoLytely prep in 2 days low residue diet prior to procedure to ensure adequate prep  Labs today as above  Await endoscopic findings for further recommendations and follow-up         TIMOTHY Ellison  [facility]  [address] [place] [address]  Office: [phone]

## 2025-05-21 ENCOUNTER — TELEPHONE (OUTPATIENT)
Dept: GASTROENTEROLOGY | Facility: CLINIC | Age: 55
End: 2025-05-21
Payer: COMMERCIAL

## 2025-05-21 LAB
ALBUMIN SERPL-MCNC: 4.3 G/DL (ref 3.5–5.2)
ALBUMIN/GLOB SERPL: 1.4 G/DL
ALP SERPL-CCNC: 109 U/L (ref 39–117)
ALT SERPL-CCNC: 26 U/L (ref 1–33)
AST SERPL-CCNC: 23 U/L (ref 1–32)
BILIRUB SERPL-MCNC: 0.5 MG/DL (ref 0–1.2)
BUN SERPL-MCNC: 9 MG/DL (ref 6–20)
BUN/CREAT SERPL: 10.5 (ref 7–25)
CALCIUM SERPL-MCNC: 9.8 MG/DL (ref 8.6–10.5)
CHLORIDE SERPL-SCNC: 92 MMOL/L (ref 98–107)
CO2 SERPL-SCNC: 26.1 MMOL/L (ref 22–29)
CREAT SERPL-MCNC: 0.86 MG/DL (ref 0.57–1)
EGFRCR SERPLBLD CKD-EPI 2021: 78.9 ML/MIN/1.73
ENDOMYSIUM IGA SER QL: NEGATIVE
ERYTHROCYTE [DISTWIDTH] IN BLOOD BY AUTOMATED COUNT: 11.8 % (ref 12.3–15.4)
GLIADIN PEPTIDE IGA SER-ACNC: 7 UNITS (ref 0–19)
GLIADIN PEPTIDE IGG SER-ACNC: 2 UNITS (ref 0–19)
GLOBULIN SER CALC-MCNC: 3 GM/DL
GLUCOSE SERPL-MCNC: 529 MG/DL (ref 65–99)
HCT VFR BLD AUTO: 44.6 % (ref 34–46.6)
HGB BLD-MCNC: 14.4 G/DL (ref 12–15.9)
IGA SERPL-MCNC: 395 MG/DL (ref 87–352)
MCH RBC QN AUTO: 30.1 PG (ref 26.6–33)
MCHC RBC AUTO-ENTMCNC: 32.3 G/DL (ref 31.5–35.7)
MCV RBC AUTO: 93.1 FL (ref 79–97)
PLATELET # BLD AUTO: 188 10*3/MM3 (ref 140–450)
POTASSIUM SERPL-SCNC: 4.5 MMOL/L (ref 3.5–5.2)
PROT SERPL-MCNC: 7.3 G/DL (ref 6–8.5)
RBC # BLD AUTO: 4.79 10*6/MM3 (ref 3.77–5.28)
SODIUM SERPL-SCNC: 131 MMOL/L (ref 136–145)
TSH SERPL DL<=0.005 MIU/L-ACNC: 4.76 UIU/ML (ref 0.27–4.2)
TTG IGA SER-ACNC: <2 U/ML (ref 0–3)
TTG IGG SER-ACNC: 6 U/ML (ref 0–5)
WBC # BLD AUTO: 4.16 10*3/MM3 (ref 3.4–10.8)

## 2025-05-21 NOTE — TELEPHONE ENCOUNTER
Per JUAN MANUEL Smith, called pt's PCP and spoke w/RN and advised of BS results.  She will advise Yelena Guzman NP of pt lab results.     Update sent to JUAN MANUEL Smith via secure chat.

## 2025-05-22 NOTE — PROGRESS NOTES
I was able to reach the patient over the phone.  She states she went to the emergency room due to hyperglycemia and was also found to have a urinary tract infection.  She is working with her endocrinologist and primary care provider to improve her blood sugar.  She is monitoring it closely.  She is taking antibiotics as prescribed to clear up UTI.  She has follow-ups scheduled with both her PCP and endocrinologist.

## 2025-06-17 ENCOUNTER — OFFICE VISIT (OUTPATIENT)
Dept: ENDOCRINOLOGY | Age: 55
End: 2025-06-17
Payer: COMMERCIAL

## 2025-06-17 VITALS
TEMPERATURE: 98.2 F | HEIGHT: 66 IN | OXYGEN SATURATION: 96 % | HEART RATE: 86 BPM | WEIGHT: 177.8 LBS | SYSTOLIC BLOOD PRESSURE: 126 MMHG | BODY MASS INDEX: 28.57 KG/M2 | DIASTOLIC BLOOD PRESSURE: 82 MMHG

## 2025-06-17 DIAGNOSIS — Z79.4 TYPE 2 DIABETES MELLITUS WITH HYPERGLYCEMIA, WITH LONG-TERM CURRENT USE OF INSULIN: Primary | ICD-10-CM

## 2025-06-17 DIAGNOSIS — E78.2 MIXED HYPERLIPIDEMIA: ICD-10-CM

## 2025-06-17 DIAGNOSIS — E11.65 TYPE 2 DIABETES MELLITUS WITH HYPERGLYCEMIA, WITH LONG-TERM CURRENT USE OF INSULIN: Primary | ICD-10-CM

## 2025-06-17 RX ORDER — INSULIN DEGLUDEC 100 U/ML
12 INJECTION, SOLUTION SUBCUTANEOUS DAILY
Qty: 15 ML | Refills: 0 | Status: SHIPPED | OUTPATIENT
Start: 2025-06-17

## 2025-06-17 RX ORDER — PEN NEEDLE, DIABETIC 32GX 5/32"
NEEDLE, DISPOSABLE MISCELLANEOUS
Qty: 100 EACH | Refills: 2 | Status: SHIPPED | OUTPATIENT
Start: 2025-06-17

## 2025-06-17 RX ORDER — HYDROCHLOROTHIAZIDE 12.5 MG/1
1 CAPSULE ORAL
Qty: 6 EACH | Refills: 1 | Status: SHIPPED | OUTPATIENT
Start: 2025-06-17

## 2025-06-17 NOTE — PROGRESS NOTES
Chief Complaint  Chief Complaint   Patient presents with    Diabetes     Type 2: Pt doesn't have meter today, doesn't check bs regularly, is up to date on eye exam, does have hx of retinopathy, no hx of neuropathy. Pt states that she does have some numbness in toes at times. Pt is interested in the tani. Also stated that she use to be on insulin in the past and if started on it again she would like to use pens.        Subjective        History of Present Illness    Seble Delgadillo 55 y.o. presents for an evaluation for Type 2 Diabetes    Pt's significant other accompanied pt during today's visit.     Referring provider: TIMOTHY Guzmán    She was diagnosed with diabetes in 2002  Insulin was started in 2023, but pt has been off for about a year as she ran out of it    Family history of diabetes: father, grandparents, aunt and maybe twin sister    Pt is on the following medications for their diabetes: Synjardy 5-1,000 mg 1 tablet twice daily and pioglitazone 15 mg daily    Lantus - not been taking for about a year due to pt running out of medication      Past medications include Trulicity stopped due to pt running out of medication  Mounjaro stopped due to pt running out of medication  Januvia - listed on pt's medication list, but pt doesn't remember taking      Personal history of pancreatitis: denies  Personal/family history of thyroid cancer: denies    History of DKA: denies      Pt complains of intermittent polydipsia, polyuria, nausea, constipation, diarrhea, abdominal pain and intermittent numbness and tingling in toes in toes    Denies chest pain, shortness of breath and vision changes    Pt does have a history of diabetic retinopathy.  Last eye exam was 01/2025    Pt does have nephropathy.  Patient is currently taking ACE    Pt denies neuropathy.    Pt denies have a history of CAD or CVA.    Last A1C in 02/24 was 7.1    Last microalbumin in 08/23 was positive          Blood Sugars    Blood glucoses are  "checked couple times a month            Hyperlipidemia     Pt is currently taking rosuvastatin 80 mg HS    Last lipid panel in 11/23 showed Total 232, HDL 48,  and Triglycerides 304              Other History    Pt stated that she was diagnosed with ovarian cancer after having hysterectomy in 2002.  Pt completed chemo treatments [id number]             Objective   Vital Signs:   /82   Pulse 86   Temp 98.2 °F (36.8 °C) (Oral)   Ht 167.6 cm (65.98\")   Wt 80.6 kg (177 lb 12.8 oz)   SpO2 96%   BMI 28.71 kg/m²       Physical Exam   Physical Exam  Constitutional:       General: She is not in acute distress.     Appearance: Normal appearance. She is not diaphoretic.   HENT:      Head: Normocephalic and atraumatic.   Eyes:      General:         Right eye: No discharge.         Left eye: No discharge.   Skin:     General: Skin is warm and dry.   Neurological:      Mental Status: She is alert.   Psychiatric:         Mood and Affect: Mood normal.         Behavior: Behavior normal.                    Results Review:   Hemoglobin A1C   Date Value Ref Range Status   02/22/2022 14.2 (H) 4.3 - 5.6 % Final     Triglycerides   Date Value Ref Range Status   12/04/2022 380 (H) 10 - 190 mg/dL Final     HDL Cholesterol   Date Value Ref Range Status   12/04/2022 46 (L) 55 - 110 mg/dL Final     LDL Cholesterol    Date Value Ref Range Status   07/23/2020 138 (H) 0 - 100 mg/dL Final   01/10/2019 94 0 - 100 mg/dL Final     VLDL Cholesterol   Date Value Ref Range Status   07/23/2020 41.2 mg/dL Final   01/10/2019 31 5 - 40 mg/dL Final     LDL/HDL RATIO   Date Value Ref Range Status   12/04/2022 3 0 - 4 Ratio Final     Comment:     Calculated by Discern Rule GL_CHEM_TRIG_CMNT         Assessment and Plan   Diagnoses and all orders for this visit:    1. Type 2 diabetes mellitus with hyperglycemia, with long-term current use of insulin (Primary)  -     Tresiba FlexTouch 100 UNIT/ML solution pen-injector injection; Inject 12 Units " under the skin into the appropriate area as directed Daily.  Dispense: 15 mL; Refill: 0  -     Insulin Pen Needle (BD Pen Needle Magaly U/F) 32G X 4 MM misc; To use once daily  Dispense: 100 each; Refill: 2  -     Continuous Glucose Sensor (FreeStyle Ron 3 Plus Sensor); Use 1 each Every 15 (Fifteen) Days. 1 each every 15 days  Dispense: 6 each; Refill: 1    Pt stated that she was diagnosed with diabetes in 2002 after hysterectomy and treatment for ovarian cancer.  Positive family history of diabetes  Pt was seeing Jorge'edmundo Barba but hasn't seen in a while as she had been at her parent's in [place] helping out as her mother was ill.  Pt stated that she was on insulin but stopped about a year ago as she ran out of medication  Pt was also on GLP1s in the past but ran out of those as well.  Given pt's current issues with nausea, diarrhea, constipation and abdominal pain I do not believe these would be good choices at this time.  Also pt stated that at her last eye appointment the eye doctor was able to see scarring from previous retinopathy.  Januvia was listed on pt's medication list, but pt doesn't remember taking  Pt hasn't been checking blood sugars regularly at home, but stated that she does have a meter at home; states that when she does check them they are high  Pt was in ER last month with very high blood sugars - 500s  No recent A1c  Given pt's blood sugars in ER and symptoms insulin needs to be restarted  Continue with Synjardy 5-1,000 mg 1 tablet twice daily   Continue pioglitazone 15 mg daily  Start Tresiba 12 units daily  Check blood sugars daily, varying the time of the day (in the morning when you get up, before lunch/dinner, bedtime or 2 hours after a meal)  Pt would like to get back on Ron, prescription sent to pharmacy      2. Mixed hyperlipidemia    Denies any personal history of cardiovascular event(s)  On statin         RTC in 1 month with me      Follow Up    Patient was given instructions and  counseling regarding her condition or for health maintenance advice. Please see specific information pulled into the AVS if appropriate.              TIMOTHY Mendieta  06/17/25

## 2025-06-17 NOTE — PATIENT INSTRUCTIONS
Continue with Synjardy 5-1,000 mg 1 tablet twice daily   Continue pioglitazone 15 mg daily  Start Tresiba 12 units daily  Check blood sugars daily, varying the time of the day (in the morning when you get up, before lunch/dinner, bedtime or 2 hours after a meal)

## 2025-07-09 ENCOUNTER — TELEPHONE (OUTPATIENT)
Dept: GASTROENTEROLOGY | Facility: CLINIC | Age: 55
End: 2025-07-09
Payer: COMMERCIAL

## 2025-07-09 NOTE — TELEPHONE ENCOUNTER
Caller: Seble Delgadillo    Relationship: Self    Best call back number: [phone]    What is the best time to reach you: ANYTIME    Who are you requesting to speak with (clinical staff, provider,  specific staff member): SCHEDULING    What was the call regarding: PT IS CALLING IN REGARDS TO SEE IF THERE'S A WAY SHE CAN GET IN SOONER THAN 8/21/2025 FOR HER PROCEDURE DUE TO THE PAIN SHE'S EXPERIENCING.    PLEASE CALL AND ADVISE. IT'S OK TO LVM.      Muscle Hinge Flap Text: The defect edges were debeveled with a #15 scalpel blade.  Given the size, depth and location of the defect and the proximity to free margins a muscle hinge flap was deemed most appropriate.  Using a sterile surgical marker, an appropriate hinge flap was drawn incorporating the defect. The area thus outlined was incised with a #15 scalpel blade.  The skin margins were undermined to an appropriate distance in all directions utilizing iris scissors.

## 2025-07-11 ENCOUNTER — TELEPHONE (OUTPATIENT)
Dept: GASTROENTEROLOGY | Facility: CLINIC | Age: 55
End: 2025-07-11

## 2025-07-17 ENCOUNTER — SPECIALTY PHARMACY (OUTPATIENT)
Dept: ENDOCRINOLOGY | Age: 55
End: 2025-07-17
Payer: COMMERCIAL

## 2025-07-17 ENCOUNTER — OFFICE VISIT (OUTPATIENT)
Dept: ENDOCRINOLOGY | Age: 55
End: 2025-07-17
Payer: COMMERCIAL

## 2025-07-17 VITALS
BODY MASS INDEX: 29.12 KG/M2 | HEIGHT: 66 IN | WEIGHT: 181.2 LBS | TEMPERATURE: 97.9 F | DIASTOLIC BLOOD PRESSURE: 82 MMHG | SYSTOLIC BLOOD PRESSURE: 124 MMHG | OXYGEN SATURATION: 99 % | HEART RATE: 85 BPM

## 2025-07-17 DIAGNOSIS — Z79.4 TYPE 2 DIABETES MELLITUS WITH HYPERGLYCEMIA, WITH LONG-TERM CURRENT USE OF INSULIN: Primary | ICD-10-CM

## 2025-07-17 DIAGNOSIS — E78.2 MIXED HYPERLIPIDEMIA: ICD-10-CM

## 2025-07-17 DIAGNOSIS — E11.65 TYPE 2 DIABETES MELLITUS WITH HYPERGLYCEMIA, WITH LONG-TERM CURRENT USE OF INSULIN: Primary | ICD-10-CM

## 2025-07-17 DIAGNOSIS — E11.65 TYPE 2 DIABETES MELLITUS WITH HYPERGLYCEMIA, WITH LONG-TERM CURRENT USE OF INSULIN: ICD-10-CM

## 2025-07-17 DIAGNOSIS — Z79.4 TYPE 2 DIABETES MELLITUS WITH HYPERGLYCEMIA, WITH LONG-TERM CURRENT USE OF INSULIN: ICD-10-CM

## 2025-07-17 RX ORDER — FAMOTIDINE 40 MG/1
TABLET, FILM COATED ORAL
COMMUNITY
Start: 2025-06-10

## 2025-07-17 RX ORDER — CITALOPRAM HYDROBROMIDE 40 MG/1
40 TABLET ORAL DAILY
COMMUNITY

## 2025-07-17 RX ORDER — INSULIN DEGLUDEC 100 U/ML
24 INJECTION, SOLUTION SUBCUTANEOUS DAILY
Start: 2025-07-17 | End: 2025-07-17 | Stop reason: SDUPTHER

## 2025-07-17 RX ORDER — INSULIN DEGLUDEC 100 U/ML
24 INJECTION, SOLUTION SUBCUTANEOUS DAILY
Qty: 15 ML | Refills: 0 | Status: SHIPPED | OUTPATIENT
Start: 2025-07-17

## 2025-07-17 RX ORDER — HYDROCHLOROTHIAZIDE 12.5 MG/1
1 CAPSULE ORAL
Qty: 6 EACH | Refills: 0 | Status: SHIPPED | OUTPATIENT
Start: 2025-07-17

## 2025-07-17 RX ORDER — GLIPIZIDE 5 MG/1
5 TABLET, FILM COATED, EXTENDED RELEASE ORAL
Qty: 90 TABLET | Refills: 1 | Status: SHIPPED | OUTPATIENT
Start: 2025-07-17

## 2025-07-17 NOTE — PROGRESS NOTES
Specialty Pharmacy Patient Management Program  Endocrinology Initial Assessment     Seble Delgadillo was referred by an Endocrinology provider to the Endocrinology Patient Management program offered by [facility] for Type 2 Diabetes on 07/17/25.  An initial outreach was conducted, including assessment of therapy appropriateness and specialty medication education for Synjardy and Tresiba. The patient was introduced to services offered by [facility], including: regular assessments, refill coordination, curbside pick-up or mail order delivery options, prior authorization maintenance, and financial assistance programs as applicable. The patient was also provided with contact information for the pharmacy team.     Insurance Coverage & Financial Support  [facility]     Relevant Past Medical History and Comorbidities  Relevant medical history and concomitant health conditions were discussed with the patient. The patient's chart has been reviewed for relevant past medical history and comorbid conditions and updated as necessary.  Past Medical History:   Diagnosis Date    Depression     Diabetes mellitus     Hyperlipidemia     Hypertension     I don’t know    Type 2 diabetes mellitus 2002     Social History     Socioeconomic History    Marital status:    Tobacco Use    Smoking status: Never     Passive exposure: Current    Smokeless tobacco: Never   Vaping Use    Vaping status: Never Used   Substance and Sexual Activity    Alcohol use: Not Currently     Comment: Rare ocasion    Drug use: Never    Sexual activity: Not Currently     Partners: Male     Birth control/protection: None, Hysterectomy     Comment: 2002 had Ovarian Cancer       Problem list reviewed by Tanvi Rivera, PharmD on 7/17/2025 at  9:52 AM    Allergies  Known allergies and reactions were discussed with the patient. The patient's chart has been reviewed for  allergy information and updated as necessary.    Allergies   Allergen Reactions    Bupropion Other (See Comments)     Caused suicidal thoughts     Oxycodone-Acetaminophen Itching    Buspirone Other (See Comments)    Depakene [Valproic Acid] Other (See Comments)     Suicidal thoughts        Allergies reviewed by Tanvi Rivera, Shemar on 7/17/2025 at  9:52 AM    Relevant Laboratory Values  Relevant laboratory values were discussed with the patient. The following specialty medication dose adjustment(s) are recommended: A1c not at goal     Lab Results   Component Value Date    HGBA1C 14.2 (H) 02/22/2022     Lab Results   Component Value Date    GLUCOSE 529 (C) 05/20/2025    CALCIUM 9.8 05/20/2025     (L) 05/20/2025    K 4.5 05/20/2025    CO2 26.1 05/20/2025    CL 92 (L) 05/20/2025    BUN 9 05/20/2025    CREATININE 0.86 05/20/2025    EGFRIFAFRI >60 07/12/2022    EGFRIFNONA 82 07/23/2020    BCR 10.5 05/20/2025    ANIONGAP 9.1 08/15/2024     Lab Results   Component Value Date    CHLPL 263 (H) 12/04/2022    TRIG 380 (H) 12/04/2022    HDL 46 (L) 12/04/2022     (H) 07/23/2020         Current Medication List  This medication list has been reviewed with the patient and evaluated for any interactions or necessary modifications/recommendations, and updated to include all prescription medications, OTC medications, and supplements the patient is currently taking.  This list reflects what is contained in the patient's profile, which has also been marked as reviewed to communicate to other providers it is the most up to date version of the patient's current medication therapy.     Current Outpatient Medications:     acetaminophen (TYLENOL) 325 MG tablet, Take 2 tablets by mouth Every 4 (Four) Hours As Needed for Mild Pain., Disp: , Rfl:     Calcium Carbonate (CALCIUM 500 PO), Take 1 tablet by mouth Daily., Disp: , Rfl:     Cetirizine HCl (ZyrTEC Allergy) 10 MG capsule, Take  by mouth., Disp: , Rfl:     cholecalciferol (VITAMIN D3) 25 MCG (1000 UT) tablet, TAKE 2  TABLETS BY MOUTH EVERY DAY, Disp: , Rfl:     citalopram (CeleXA) 40 MG tablet, Take 1 tablet by mouth Daily., Disp: , Rfl:     dicyclomine (BENTYL) 10 MG capsule, Take 1 capsule by mouth 4 (Four) Times a Day As Needed (Abdominal cramping)., Disp: 30 capsule, Rfl: 0    Empagliflozin-metFORMIN HCl (Synjardy) 5-1000 MG tablet, Take 1 tablet by mouth 2 (Two) Times a Day., Disp: , Rfl:     famotidine (PEPCID) 40 MG tablet, TAKE 1 TABLET BY MOUTH 2 TIMES DAILY AS NEEDED FOR HEARTBURN., Disp: , Rfl:     fluticasone (Flonase) 50 MCG/ACT nasal spray, 2 sprays into the nostril(s) as directed by provider Daily., Disp: 3 bottle, Rfl: 3    glucosamine-chondroitin 500-400 MG capsule capsule, Take 1-2 capsules by mouth Daily., Disp: , Rfl:     guaiFENesin (Mucinex) 600 MG 12 hr tablet, Take 1 tablet by mouth 2 (Two) Times a Day As Needed for Congestion., Disp: , Rfl:     MULTIPLE VITAMIN PO, Take 1 tablet by mouth Daily., Disp: , Rfl:     pantoprazole (PROTONIX) 40 MG EC tablet, Take 1 tablet by mouth Daily., Disp: 90 tablet, Rfl: 3    pioglitazone (ACTOS) 15 MG tablet, Take 1 tablet by mouth Daily., Disp: , Rfl:     Pumpkin Seed-Soy Germ (AZO BLADDER CONTROL/GO-LESS PO), Take 2 tablets by mouth As Needed (bladder control)., Disp: , Rfl:     ramipril (ALTACE) 2.5 MG capsule, TAKE 1 CAPSULE BY MOUTH EVERY DAY, Disp: , Rfl:     rosuvastatin (CRESTOR) 40 MG tablet, Take 1 tablet by mouth Daily., Disp: , Rfl:     sucralfate (CARAFATE) 1 g tablet, Take 1 tablet by mouth 2 (Two) Times a Day As Needed (stomach aches)., Disp: , Rfl:     vitamin D (ERGOCALCIFEROL) 1.25 MG (05609 UT) capsule capsule, 1 cap(s) orally once a week, Disp: , Rfl:     aspirin 81 MG EC tablet, Take 1 tablet by mouth As Needed for Mild Pain., Disp: , Rfl:     BD Pen Needle Magaly 2nd Gen 32G X 4 MM misc, , Disp: , Rfl:     Continuous Glucose Sensor (FreeStyle Ron 3 Plus Sensor), Use 1 sensor Every 15 (Fifteen) Days., Disp: 6 each, Rfl: 0    glipizide (Glucotrol XL)  5 MG ER tablet, Take 1 tablet by mouth Daily With Breakfast., Disp: 90 tablet, Rfl: 1    Insulin Pen Needle (BD Pen Needle Magaly U/F) 32G X 4 MM misc, To use once daily, Disp: 100 each, Rfl: 2    Tresiba FlexTouch 100 UNIT/ML solution pen-injector injection, Inject 24 Units under the skin into the appropriate area as directed Daily., Disp: 15 mL, Rfl: 0    Medicines reviewed by Tanvi Rivera PharmD on 7/17/2025 at  9:47 AM  Medicines reviewed by Tanvi Rivera PharmD on 7/17/2025 at  9:51 AM  Medicines reviewed by Tanvi Rivera PharmD on 7/17/2025 at  9:55 AM    Drug Interactions  No Clinically Significant DDIs Were Identified at Present Time Upon Marking Medications Reviewed   Recommended Medications Assessment  Aspirin: Not Taking Currently  Statin: Currently Taking   ACEi/ARB: Currently Taking     Initial Education Provided for Specialty Medication  The patient has been provided with the following education and any applicable administration techniques (i.e. self-injection) have been demonstrated for the therapies indicated. All questions and concerns have been addressed prior to the patient receiving the medication, and the patient has verbalized comprehension of the education and any materials provided. Additional patient education shall be provided and documented upon request by the patient, provider, or payer.    TRESIBA® (insulin degludec)   Medication Expectations    Why am I taking this medication?  You are taking this medication to lower blood sugar and A1c because you have type 1 or type 2 diabetes. Diabetes is not curable but with proper medication and treatment, we can keep your blood sugar within your personalized target range.    What should I expect while on this medication?  You should expect to see your blood sugar and A1c decrease over time.    How does the medication work?  Tresiba is a long-acting insulin that slowly and steadily releases in the body for 24-hours of blood sugar control.  Insulin helps the sugar in your blood get into cells and provide them with energy to fuel your body.     How long will I be on this medication for?  If you have Type 1 diabetes, you will be on this medication or another insulin throughout your lifetime because your body cannot make its own insulin. If you have Type 2 diabetes, the amount of time you will be on this medication will be determined by your doctor based on blood sugar and A1c control. You will most likely be on this medication or another diabetes medication throughout your lifetime because your body does not make enough insulin. Do not abruptly stop this medication without talking to your doctor first.     How do I take this medication?  Take as directed on your prescription label. Tresiba is usually given once daily and can be taken with or without food. Tresiba is injected under the skin (subcutaneously) of your stomach, thigh, or upper arm. Use a different injection site in the same body region each day.       What are some possible side effects?  Tresiba may cause low blood sugar (hypoglycemia). Signs and symptoms that may indicate hypoglycemia include dizziness, sweating, anxiety, irritability, confusion, or headache. The most common side effects of Tresiba include reactions or skin thickening at the injection site, headache, weight gain, swelling of your hands and feet, and cough/runny nose/sore throat.    What happens if I miss a dose?  If you miss a dose, take it as soon as your remember. If it is close to your next dose, skip it. Always make sure there are at least 8 hours between doses and never take 2 doses at once.       Medication Safety    What are things I should warn my doctor immediately about?  Talk to your doctor if you are pregnant, planning to become pregnant, or breastfeeding. Also tell your doctor if you notice any signs/symptoms of an allergic reaction (rash, hives, difficulty breathing, etc.).    What are things that I should be  cautious of?  Be cautious of any side effects from this medication. Talk to your doctor if any new ones develop or aren't getting better.    What are some medications that can interact with this one?  Do not take this medication with rosiglitazone. Taking Tresiba with other medications that lower your blood sugar may increase the risk of hypoglycemia. Your doctor may reduce the dose of these medications when you start Tresiba. Always tell your doctor or pharmacist immediately if you start taking any new medications, including over-the-counter medications, vitamins, and herbal supplements.        Medication Storage/Handling    How should I handle this medication?  Keep this medication out of reach of pets/children and keep the pen capped when not in use. Do not share your medicine with others.     How does this medication need to be stored?  Store your new, unused pens in the original carton in the refrigerator. Protect it from light. Do not freeze. You may store your opened Tresiba at room temperature or in the refrigerator for up to 56 days (8 weeks). Always remove the needle from the pen before storing.     How should I dispose of this medication?  Used Tresiba pens should be thrown away after 56 days even if insulin remains.?Place your used pen and needle in an approved sharps container. ?If your doctor decides to stop this medication, take to your local police station for proper disposal. Some pharmacies also have?take-back bins for medication drop-off.??       Resources/Support    How can I remind myself to take this medication?  You can download reminder apps to help you manage your refills. You may also set an alarm on your phone to remind you.     Is financial support available?   [employer] can provide co-pay cards if you have commercial insurance or patient assistance if you have Medicare or no insurance.     Which vaccines are recommended for me?  Talk to your doctor about these vaccines: Flu,  Coronavirus (COVID-19), Pneumococcal (pneumonia), Tdap, Hepatitis B, Zoster (shingles)         SYNJARDY® (empagliflozin + metformin)  Medication Expectations   Why am I taking this medication? You are taking this medication to lower blood sugar because you have type 2 diabetes. Diabetes is not curable but with proper medication and treatment, we can keep your blood sugar within your personalized target range. Jardiance (empagliflozin), one of the medications in Synjardy, can reduce the risk of death from heart attack or stroke.   What should I expect while on this medication? You should expect to see your blood sugar and A1c decrease over time. You may also see a decrease in your blood pressure and it can help some people lose weight.     How does the medication work? Synjardy works by removing some sugar that the body doesn't need through urination, lowering sugar production in the body, reducing the amount of sugar that enters the bloodstream after a meal, and making your body more sensitive to insulin.     How long will I be on this medication for? The amount of time you will be on this medication will be determined by your doctor based on blood sugar and A1c control. You will most likely be on this medication or another diabetes medication throughout your lifetime. Do not abruptly stop this medication without talking to your doctor first.    How do I take this medication? Take as directed on your prescription label, usually once or twice a day. Take with food.     What are some possible side effects? The most common side effects include increased urination, urinary tract infections or genital yeast infections, and diarrhea. .  Talk with your doctor if you notice abnormal odor, itching, pain, or discharge.   What happens if I miss a dose? If you miss a dose, take it as soon as you remember. If it is close to your next dose, skip it (do not take 2 doses at once)     Medication Safety   What are things I should  warn my doctor immediately about? Tell your doctor if you have kidney or liver disease, heart failure, pancreas problems, vitamin B-12 deficiency, or history of frequent genital yeast or urinary tract infections. Tell your doctor if you are on a low-salt diet, if you drink alcohol, or if you are having surgery. Talk to your doctor if you are pregnant, planning to become pregnant, or breastfeeding. Also tell your doctor if you notice any signs/symptoms of an allergic reaction (rash, hives, difficulty breathing, etc.).   What are things that I should be cautious of? Be cautious of any side effects from this medication. Talk to your doctor or pharmacist if any new ones develop or aren't getting better.   What are some medications that can interact with this one? This medication can interact with the dye used for an x-ray or CT-scan. Some medications that interact include ranolazine, cimetidine, diuretics (water pills), and other medications that may also lower your blood sugar such as insulins and glipizide/glimepiride/glyburide. Your doctor may reduce the dose of other diabetes medications when you start Synjardy. Always tell your doctor or pharmacist immediately if you start taking any new medications, including over-the-counter medications, vitamins, and herbal supplements.      Medication Storage/Handling   How should I handle this medication? Keep this medication out of reach of pets/children in tightly sealed container   How does this medication need to be stored? Store at room temperature and keep dry (don't keep in bathroom or other room with moisture)   How should I dispose of this medication? There should not be a need to dispose of this medication unless your provider decides to change the dose or therapy. If that is the case, take to your local police station for proper disposal. Some pharmacies also have take-back bins for medication drop-off.      Resources/Support   How can I remind myself to take this  medication? You can download reminder apps to help you manage your refills or set an alarm on your phone to remind you. The pharmacy carries pill boxes that you can place next to an area you pass everyday (such as where you place your car keys or where you charge your phone)   Is financial support available?  [employer] and [employer] can provide co-pay cards if you have commercial insurance or patient assistance if you have Medicare or no insurance.    Which vaccines are recommended for me? Talk to your doctor about these vaccines: Flu, Coronavirus (COVID-19), Pneumococcal (pneumonia), Tdap, Hepatitis B, Zoster (shingles)            Adherence and Self-Administration  Adherence related to the patient's specialty therapy was discussed with the patient. The Adherence segment of this outreach has been reviewed and updated.     Is there a concern with patient's ability to self administer the medication correctly and without issue?: No  Were any potential barriers to adherence identified during the initial assessment or patient education?: No  Are there any concerns regarding the patient's understanding of the importance of medication adherence?: No  Methods for Supporting Patient Adherence and/or Self-Administration: No known barriers at this time     Open Medication Therapy Problems  No medication therapy recommendations to display    Goals of Therapy  Goals related to the patient's specialty therapy were discussed with the patient. The Patient Goals segment of this outreach has been reviewed and updated.   Goals Addressed Today        Specialty Pharmacy General Goal      Clinical Goal: Achieve  A1 <7%    Baseline Value:  Lab Results   Component Value Date    HGBA1C 14.2 (H) 02/22/2022        Progress:  Date A1c    07/17/2025  New Enrollment - A1c above goal  but due to recheck (will get before next visit) - starting Glipizide 5mg daily today and Tresiba increased to 24units daily - will see impact on  glycemic control with next lab draw    02/22/2022 14.2 (H)    11/03/2020 12.2    07/23/2020 10.30 (H)                 Reassessment Plan & Follow-Up  1. Medication Therapy Changes: INCREASE Tresiba to 24units daily  2. Related Plans, Therapy Recommendations, or Therapy Problems to Be Addressed:  START Glipizide ER 5mg daily before breakfast   All prescriptions will be transferred from [facility] to [facility] - patient will call when she's ready for a refill   3. Pharmacist to perform regular assessments no more than (6) months from the previous assessment.  4. Care Coordinator to set up future refill outreaches, coordinate prescription delivery, and escalate clinical questions to pharmacist.  5. Welcome information and patient satisfaction survey to be sent by specialty pharmacy team with patient's initial fill.    Attestation  Therapeutic appropriateness: Appropriate   I attest the patient was actively involved in and has agreed to the above plan of care. If the prescribed therapy is at any point deemed not appropriate based on the current or future assessments, a consultation will be initiated with the patient's specialty care provider to determine the best course of action. The revised plan of therapy will be documented along with any required assessments and/or additional patient education provided.     Tanvi Rivera, Shemar, BCACP, BC-ADM, CDCES  Clinical Specialty Pharmacist, Endocrinology  7/17/2025  10:28 EDT    Discussed the aforementioned information with the patient via In-Person.

## 2025-07-17 NOTE — PATIENT INSTRUCTIONS
Increase Tresiba 24 units daily   Start glipizide ER 5 mg daily with breakfast    Continue with Synjardy 5-1,000 mg 1 tablet twice daily  Continue with pioglitazone 15 mg daily

## 2025-07-17 NOTE — PROGRESS NOTES
Specialty Pharmacy Patient Management Program  Endocrinology Initial Fill Outreach      Seble is a 55 y.o. female contacted today regarding initial fill of her medication(s).    Specialty medication(s) and dose(s) confirmed: none  Non-SpRx Medication(s): Glipizide ER 5mg daily      Delivery Questions      Flowsheet Row Most Recent Value   Delivery method  at Pharmacy   Number of medications in delivery 1   Medication(s) being filled and delivered glipiZIDE (GLUCOTROL XL)   Copay verified? Yes   Copay amount $0.00   Copay form of payment No copayment ($0)   Delivery Date Selection 07/17/25   Signature Required No            Follow-Up: 21 days     Tanvi Rivera, PharmD, BCACP, BC-ADM, CDCES  Clinical Specialty Pharmacist, Endocrinology  7/17/2025  11:20 EDT

## 2025-07-17 NOTE — PROGRESS NOTES
Chief Complaint  Chief Complaint   Patient presents with    Diabetes     Ron  Left foot is swollen and painful       Subjective        History of Present Illness    Seble Delgadillo 55 y.o. presents for a follow-up for Type 2 Diabetes    Pt's significant other accompanied pt during today's visit.     She was diagnosed with diabetes in 2002  Insulin was started in 2023, but pt has been off for about a year as she ran out of it     Family history of diabetes: father, grandparents, aunt and maybe twin sister     Pt is on the following medications for their diabetes: Synjardy 5-1,000 mg 1 tablet twice daily, pioglitazone 15 mg daily and Tresiba 20 units daily           Trulicity stopped due to pt running out of medication  Mounjaro stopped due to pt running out of medication  Januvia - listed on pt's medication list, but pt doesn't remember taking      Pt was on GLP1s in the past but given pt's current issues with nausea, diarrhea, constipation and abdominal pain, I do not believe these would be good choices at this time. Also pt stated that at her last eye appointment the eye doctor was able to see scarring from previous retinopathy.           Personal history of pancreatitis: denies  Personal/family history of thyroid cancer: denies     History of DKA: denies      Pt complains of intermittent polydipsia, polyuria, nausea, constipation, diarrhea, abdominal pain and intermittent numbness and tingling in toes in toes     Denies chest pain, shortness of breath and vision changes    Pt does have a history of diabetic retinopathy.  Last eye exam was 01/2025     Pt does have nephropathy.  Patient is currently taking ACE     Pt denies neuropathy.     Pt denies have a history of CAD or CVA.    Last A1C in 02/24 was 7.1     Last microalbumin in 08/23 was positive          Blood Sugars    Blood glucoses are checked via Ron    Fasting blood glucoses: high 100s- 300s    Pre-meal blood glucoses: high 100s- 400s    Pt denies  "episodes of hypoglycemia.          Sensor Data    Time in range 20%  High 44%  Very high 36%  Low 0%  Very low 0%      Average Glucose - 233 mg/dL      Sensor Wear - 98 %              Hyperlipidemia     Pt is currently taking rosuvastatin 80 mg HS     Last lipid panel in 11/23 showed Total 232, HDL 48,  and Triglycerides 304                    Other History     Pt stated that she was diagnosed with ovarian cancer after having hysterectomy in 2002.  Pt completed chemo treatments [id number]                   Objective   Vital Signs:   /82   Pulse 85   Temp 97.9 °F (36.6 °C) (Oral)   Ht 167.6 cm (65.98\")   Wt 82.2 kg (181 lb 3.2 oz)   SpO2 99%   BMI 29.26 kg/m²       Physical Exam   Physical Exam  Constitutional:       General: She is not in acute distress.     Appearance: Normal appearance. She is not diaphoretic.   HENT:      Head: Normocephalic and atraumatic.   Eyes:      General:         Right eye: No discharge.         Left eye: No discharge.   Skin:     General: Skin is warm and dry.   Neurological:      Mental Status: She is alert.   Psychiatric:         Mood and Affect: Mood normal.         Behavior: Behavior normal.                    Results Review:   Hemoglobin A1C   Date Value Ref Range Status   02/22/2022 14.2 (H) 4.3 - 5.6 % Final     Triglycerides   Date Value Ref Range Status   12/04/2022 380 (H) 10 - 190 mg/dL Final     HDL Cholesterol   Date Value Ref Range Status   12/04/2022 46 (L) 55 - 110 mg/dL Final     LDL Cholesterol    Date Value Ref Range Status   07/23/2020 138 (H) 0 - 100 mg/dL Final   01/10/2019 94 0 - 100 mg/dL Final     VLDL Cholesterol   Date Value Ref Range Status   07/23/2020 41.2 mg/dL Final   01/10/2019 31 5 - 40 mg/dL Final     LDL/HDL RATIO   Date Value Ref Range Status   12/04/2022 3 0 - 4 Ratio Final     Comment:     Calculated by Discern Rule GL_CHEM_TRIG_CMNT         Assessment and Plan  Diagnoses and all orders for this visit:    1. Type 2 diabetes mellitus " with hyperglycemia, with long-term current use of insulin (Primary)  -     glipizide (Glucotrol XL) 5 MG ER tablet; Take 1 tablet by mouth Daily With Breakfast.  Dispense: 90 tablet; Refill: 1  -     Tresiba FlexTouch 100 UNIT/ML solution pen-injector injection; Inject 24 Units under the skin into the appropriate area as directed Daily.  -     Hemoglobin A1c; Future  -     Comprehensive Metabolic Panel; Future  -     Microalbumin / Creatinine Urine Ratio - Urine, Clean Catch; Future    Blood sugars overall are still running high  Continue with Synjardy 5-1,000 mg 1 tablet twice daily  Continue with pioglitazone 15 mg daily   Increase Tresiba 24 units daily to help with morning hyperglycemia  Start glipizide ER 5 mg daily with breakfast to help with post-prandial hyperglycemia  Pt was on GLP1s in the past but given pt's current issues with nausea, diarrhea, constipation and abdominal pain, I do not believe these would be good choices at this time. Also pt stated that at her last eye appointment the eye doctor was able to see scarring from previous retinopathy.   Continue with Ron  Pt stated that her left foot is swollen and tender.  No redness, no sides of infection noted.  Advised pt to go to urgent care as it probably needs imaging.  Pt verbalized understanding       2. Mixed hyperlipidemia  -     Comprehensive Metabolic Panel; Future  -     Lipid Panel; Future    Denies any personal history of cardiovascular event(s)  On statin         No refills needed at this time      RTC in 1 month with me, labs prior      Follow Up    Patient was given instructions and counseling regarding her condition or for health maintenance advice. Please see specific information pulled into the AVS if appropriate.                Megan Kwon, APRN  07/17/25

## 2025-07-17 NOTE — TELEPHONE ENCOUNTER
Specialty Pharmacy Patient Management Program  Per Protocol Prescription Order/Refill     Patient currently fills medications at [facility] and is enrolled in an Endocrinology Patient Management Program.     Requested Prescriptions     Pending Prescriptions Disp Refills    Continuous Glucose Sensor (FreeStyle Ron 3 Plus Sensor) 6 each 0     Sig: Use 1 each Every 15 (Fifteen) Days. 1 each every 15 days    Tresiba FlexTouch 100 UNIT/ML solution pen-injector injection 15 mL 0     Sig: Inject 24 Units under the skin into the appropriate area as directed Daily.     Prescription orders above were sent to the pharmacy per Collaborative Care Agreement Protocol.     Last Office Visit: 07/17/25  Next Office Visit: 08/21/25      Tanvi Rivera, PharmD, BCACP, BC-ADM, [facility]  Clinical Specialty Pharmacist, Endocrinology  7/17/2025

## 2025-07-17 NOTE — PROGRESS NOTES
Specialty Endocrine Disorders Pharmacy Patient Management Program  Under Review      Benefits investigation to get a quote for medication cost through your current prescription insurance was completed in preparation for your upcoming visit. You have been prescribed a medication that can be followed by the Patient Management Program and Specialty Pharmacy services offered by [facility].     While we are checking your benefits for eligibility to fill your specialty medication at [facility], your enrollment in the Patient Management Program has been marked UNDER REVIEW.     Current Dispensing Pharmacy: [facility]  Upcoming visit date: 7/17/25        Benefit Investigation:  Completed 7/17/2025 08:43 EDT    Current insurance: [employer]  BIN: [id number]   PCN: [id number]  ID: [id number]  Group: [id number]   Target Medication(s) and anticipated cost:   tresiba: $0 for 30 days- Dose adjusted in order to get a paid claim- RTS  Synjardy 5-1000mg: $101.63 for 90 days- should be $10 with copay card  Mounjaro: $70.85 for 28 days- should be $25 with copay card        If you have any questions or concerns about your medications or our specialty program, you are welcome to reach out to our in clinic pharmacy team at [phone]. We hope to speak with you soon!      Frances London, PharmD  Clinical Specialty Pharmacist, Endocrinology  7/17/2025  08:47 EDT

## 2025-07-31 ENCOUNTER — HOSPITAL ENCOUNTER (OUTPATIENT)
Facility: HOSPITAL | Age: 55
Setting detail: HOSPITAL OUTPATIENT SURGERY
Discharge: HOME | End: 2025-07-31
Attending: INTERNAL MEDICINE | Admitting: INTERNAL MEDICINE
Payer: COMMERCIAL

## 2025-07-31 ENCOUNTER — ANESTHESIA (OUTPATIENT)
Dept: GASTROENTEROLOGY | Facility: HOSPITAL | Age: 55
End: 2025-07-31
Payer: COMMERCIAL

## 2025-07-31 ENCOUNTER — ANESTHESIA EVENT (OUTPATIENT)
Dept: GASTROENTEROLOGY | Facility: HOSPITAL | Age: 55
End: 2025-07-31
Payer: COMMERCIAL

## 2025-07-31 VITALS
BODY MASS INDEX: 28.14 KG/M2 | HEART RATE: 77 BPM | WEIGHT: 179.3 LBS | SYSTOLIC BLOOD PRESSURE: 108 MMHG | RESPIRATION RATE: 16 BRPM | HEIGHT: 67 IN | DIASTOLIC BLOOD PRESSURE: 67 MMHG | OXYGEN SATURATION: 96 %

## 2025-07-31 DIAGNOSIS — R11.0 NAUSEA: ICD-10-CM

## 2025-07-31 DIAGNOSIS — R10.33 PERIUMBILICAL PAIN: ICD-10-CM

## 2025-07-31 DIAGNOSIS — R10.13 DYSPEPSIA: ICD-10-CM

## 2025-07-31 DIAGNOSIS — Z87.19 HISTORY OF COLITIS: ICD-10-CM

## 2025-07-31 LAB — GLUCOSE BLDC GLUCOMTR-MCNC: 131 MG/DL (ref 65–99)

## 2025-07-31 PROCEDURE — 45385 COLONOSCOPY W/LESION REMOVAL: CPT | Performed by: INTERNAL MEDICINE

## 2025-07-31 PROCEDURE — 43239 EGD BIOPSY SINGLE/MULTIPLE: CPT | Performed by: INTERNAL MEDICINE

## 2025-07-31 PROCEDURE — 82948 REAGENT STRIP/BLOOD GLUCOSE: CPT | Performed by: INTERNAL MEDICINE

## 2025-07-31 PROCEDURE — 25010000002 GLYCOPYRROLATE 0.2 MG/ML SOLUTION: Performed by: NURSE ANESTHETIST, CERTIFIED REGISTERED

## 2025-07-31 PROCEDURE — 25810000003 LACTATED RINGERS PER 1000 ML: Performed by: INTERNAL MEDICINE

## 2025-07-31 PROCEDURE — 88305 TISSUE EXAM BY PATHOLOGIST: CPT | Performed by: INTERNAL MEDICINE

## 2025-07-31 PROCEDURE — 25010000002 LIDOCAINE PF 2% 2 % SOLUTION: Performed by: NURSE ANESTHETIST, CERTIFIED REGISTERED

## 2025-07-31 PROCEDURE — 25010000002 PROPOFOL 10 MG/ML EMULSION: Performed by: NURSE ANESTHETIST, CERTIFIED REGISTERED

## 2025-07-31 RX ORDER — GLYCOPYRROLATE 0.2 MG/ML
INJECTION INTRAMUSCULAR; INTRAVENOUS AS NEEDED
Status: DISCONTINUED | OUTPATIENT
Start: 2025-07-31 | End: 2025-07-31 | Stop reason: SURG

## 2025-07-31 RX ORDER — SODIUM CHLORIDE, SODIUM LACTATE, POTASSIUM CHLORIDE, CALCIUM CHLORIDE 600; 310; 30; 20 MG/100ML; MG/100ML; MG/100ML; MG/100ML
30 INJECTION, SOLUTION INTRAVENOUS CONTINUOUS PRN
Status: DISCONTINUED | OUTPATIENT
Start: 2025-07-31 | End: 2025-07-31 | Stop reason: HOSPADM

## 2025-07-31 RX ORDER — PROPOFOL 10 MG/ML
VIAL (ML) INTRAVENOUS AS NEEDED
Status: DISCONTINUED | OUTPATIENT
Start: 2025-07-31 | End: 2025-07-31 | Stop reason: SURG

## 2025-07-31 RX ORDER — LIDOCAINE HYDROCHLORIDE 20 MG/ML
INJECTION, SOLUTION EPIDURAL; INFILTRATION; INTRACAUDAL; PERINEURAL AS NEEDED
Status: DISCONTINUED | OUTPATIENT
Start: 2025-07-31 | End: 2025-07-31 | Stop reason: SURG

## 2025-07-31 RX ADMIN — GLYCOPYRROLATE 0.2 MG: 0.2 INJECTION INTRAMUSCULAR; INTRAVENOUS at 12:09

## 2025-07-31 RX ADMIN — LIDOCAINE HYDROCHLORIDE 100 MG: 20 INJECTION, SOLUTION EPIDURAL; INFILTRATION; INTRACAUDAL; PERINEURAL at 12:12

## 2025-07-31 RX ADMIN — PROPOFOL 100 MG: 10 INJECTION, EMULSION INTRAVENOUS at 12:12

## 2025-07-31 RX ADMIN — SODIUM CHLORIDE, POTASSIUM CHLORIDE, SODIUM LACTATE AND CALCIUM CHLORIDE 30 ML/HR: 600; 310; 30; 20 INJECTION, SOLUTION INTRAVENOUS at 11:59

## 2025-07-31 RX ADMIN — PROPOFOL 200 MCG/KG/MIN: 10 INJECTION, EMULSION INTRAVENOUS at 12:12

## 2025-07-31 RX ADMIN — PROPOFOL 30 MG: 10 INJECTION, EMULSION INTRAVENOUS at 12:16

## 2025-07-31 NOTE — ANESTHESIA PREPROCEDURE EVALUATION
Anesthesia Evaluation     Patient summary reviewed and Nursing notes reviewed                Airway   Mallampati: III  Possible difficult intubation  Dental      Pulmonary - negative pulmonary ROS   Cardiovascular     ECG reviewed  Rhythm: regular  Rate: normal    (+) hypertension, hyperlipidemia      Neuro/Psych  (+) psychiatric history Depression  GI/Hepatic/Renal/Endo    (+) obesity, GERD, diabetes mellitus type 2 using insulin    Musculoskeletal (-) negative ROS    Abdominal    Substance History - negative use     OB/GYN negative ob/gyn ROS         Other                    Anesthesia Plan    ASA 3     MAC     intravenous induction     Anesthetic plan, risks, benefits, and alternatives have been provided, discussed and informed consent has been obtained with: patient.    CODE STATUS:

## 2025-07-31 NOTE — ANESTHESIA POSTPROCEDURE EVALUATION
Patient: Seble Delgadillo    Procedure Summary       Date: 07/31/25 Room / Location: [facility] ENDOSCOPY 10 / [facility] ENDOSCOPY    Anesthesia Start: 1208 Anesthesia Stop: 1241    Procedures:       COLONOSCOPY into cecum with polypectomy      ESOPHAGOGASTRODUODENOSCOPY with biopsy (Esophagus) Diagnosis:       Periumbilical pain      Nausea      Dyspepsia      History of colitis      (Periumbilical pain [R10.33])      (Nausea [R11.0])      (Dyspepsia [R10.13])      (History of colitis [Z87.19])    Surgeons: Roberto Figueroa MD Provider: Davi Hernandes MD    Anesthesia Type: MAC ASA Status: 3            Anesthesia Type: MAC    Vitals  Vitals Value Taken Time   BP 97/56 07/31/25 12:55   Temp     Pulse 76 07/31/25 12:58   Resp 15 07/31/25 12:55   SpO2 96 % 07/31/25 12:58   Vitals shown include unfiled device data.        Post Anesthesia Care and Evaluation    Patient location during evaluation: PACU  Patient participation: complete - patient participated  Level of consciousness: awake and alert  Pain management: adequate    Airway patency: patent  Anesthetic complications: No anesthetic complications    Cardiovascular status: acceptable  Respiratory status: acceptable  Hydration status: acceptable    Comments: --------------------            07/31/25               1255     --------------------   BP:       97/56      Pulse:      82       Resp:       15       SpO2:      96%      --------------------

## 2025-07-31 NOTE — H&P
[facility]  Pre Procedure History & Physical    Chief Complaint:   Abdominal pain    Subjective     HPI:   55 y.o. female here for EGD/colon for evaluation of abdominal pain    Past Medical History:   Past Medical History:   Diagnosis Date    Depression     Diabetes mellitus     Hyperlipidemia     Hypertension     I don’t know    Type 2 diabetes mellitus 2002       Past Surgical History:  Past Surgical History:   Procedure Laterality Date    HYSTERECTOMY  2002    TONSILLECTOMY  2010       Family History:  Family History   Problem Relation Age of Onset    Other Mother         non hodgkins lymphoma     Diabetes Mother     Cancer Mother         Splinic (Spleen) Marginal Lymphoma and bone cancer    Hypertension Mother     Diabetes Father     Other Sister         prediabetes    Diabetes Maternal Grandmother     Diabetes Paternal Grandmother     Prostate cancer Paternal Grandfather     Diabetes Maternal Aunt     Hypertension Sister     Diabetes Maternal Aunt     Hypertension Sister        Social History:   reports that she has never smoked. She has been exposed to tobacco smoke. She has never used smokeless tobacco. She reports that she does not currently use alcohol. She reports that she does not use drugs.    Medications:   Medications Prior to Admission   Medication Sig Dispense Refill Last Dose/Taking    acetaminophen (TYLENOL) 325 MG tablet Take 2 tablets by mouth Every 4 (Four) Hours As Needed for Mild Pain.       albuterol sulfate HFA (Ventolin HFA) 108 (90 Base) MCG/ACT inhaler Inhale 2 puffs Every 4 (Four) Hours As Needed for Cough. 18 g 0     aspirin 81 MG EC tablet Take 1 tablet by mouth As Needed for Mild Pain.       BD Pen Needle Magaly 2nd Gen 32G X 4 MM misc        Calcium Carbonate (CALCIUM 500 PO) Take 1 tablet by mouth Daily.       Cetirizine HCl (ZyrTEC Allergy) 10 MG capsule Take  by mouth.       cholecalciferol (VITAMIN D3) 25 MCG (1000 UT) tablet TAKE 2 TABLETS BY MOUTH EVERY  DAY       citalopram (CeleXA) 40 MG tablet Take 1 tablet by mouth Daily.       Continuous Glucose Sensor (FreeStyle Ron 3 Plus Sensor) Use 1 sensor Every 15 (Fifteen) Days. 6 each 0     Continuous Glucose Sensor (FreeStyle Ron 3 Plus Sensor) Inject 1 each under the skin into the appropriate area as directed Every 15 (Fifteen) Days. 6 each 1     diclofenac (VOLTAREN) 50 MG EC tablet Take 1 tablet by mouth 3 (Three) Times a Day As Needed (pain). 30 tablet 0     dicyclomine (BENTYL) 10 MG capsule Take 1 capsule by mouth 4 (Four) Times a Day As Needed (Abdominal cramping). 30 capsule 0     Empagliflozin-metFORMIN HCl (Synjardy) 5-1000 MG tablet Take 1 tablet by mouth 2 (Two) Times a Day.       famotidine (PEPCID) 40 MG tablet TAKE 1 TABLET BY MOUTH 2 TIMES DAILY AS NEEDED FOR HEARTBURN.       famotidine (PEPCID) 40 MG tablet Take 1 tablet by mouth 2 (Two) Times a Day As Needed for heartburn. 60 tablet 11     fluticasone (Flonase) 50 MCG/ACT nasal spray 2 sprays into the nostril(s) as directed by provider Daily. 3 bottle 3     glipizide (Glucotrol XL) 5 MG ER tablet Take 1 tablet by mouth Daily With Breakfast. 90 tablet 1     glucosamine-chondroitin 500-400 MG capsule capsule Take 1-2 capsules by mouth Daily.       guaiFENesin (Mucinex) 600 MG 12 hr tablet Take 1 tablet by mouth 2 (Two) Times a Day As Needed for Congestion.       Insulin Pen Needle (BD Pen Needle Magaly 2nd Gen) 32G X 4 MM misc Inject 1 each under the skin into the appropriate area as directed Daily. 100 each 2     Insulin Pen Needle (BD Pen Needle Magaly U/F) 32G X 4 MM misc To use once daily 100 each 2     MULTIPLE VITAMIN PO Take 1 tablet by mouth Daily.       pantoprazole (PROTONIX) 40 MG EC tablet Take 1 tablet by mouth Daily. 90 tablet 3     pantoprazole (PROTONIX) 40 MG EC tablet Take 1 tablet by mouth Daily. 90 tablet 3     pioglitazone (ACTOS) 15 MG tablet Take 1 tablet by mouth Daily.       Pumpkin Seed-Soy Germ (AZO BLADDER CONTROL/GO-LESS PO)  Take 2 tablets by mouth As Needed (bladder control).       ramipril (ALTACE) 2.5 MG capsule TAKE 1 CAPSULE BY MOUTH EVERY DAY       ramipril (ALTACE) 2.5 MG capsule Take 1 capsule by mouth Daily. 90 capsule 0     rosuvastatin (CRESTOR) 40 MG tablet Take 1 tablet by mouth Daily.       sucralfate (CARAFATE) 1 g tablet Take 1 tablet by mouth 2 (Two) Times a Day As Needed (stomach aches).       sucralfate (CARAFATE) 1 g tablet Take 1 tablet by mouth 4 (Four) Times a Day As Needed for Breakthrough Reflux. 120 tablet 5     Tresiba FlexTouch 100 UNIT/ML solution pen-injector injection Inject 24 Units under the skin into the appropriate area as directed Daily. 15 mL 0     vitamin D (ERGOCALCIFEROL) 1.25 MG (56214 UT) capsule capsule 1 cap(s) orally once a week          Allergies:  Bupropion, Oxycodone-acetaminophen, Buspirone, and Depakene [valproic acid]    ROS:    Pertinent items are noted in HPI     Objective     There were no vitals taken for this visit.    Physical Exam   Constitutional: Pt is oriented to person, place, and time and well-developed, well-nourished, and in no distress.   Mouth/Throat: Oropharynx is clear and moist.   Neck: Normal range of motion.   Cardiovascular: Normal rate, regular rhythm and normal heart sounds.    Pulmonary/Chest: Effort normal and breath sounds normal.   Abdominal: Soft. Nontender  Skin: Skin is warm and dry.   Psychiatric: Mood, memory, affect and judgment normal.     Assessment & Plan     Diagnosis:  Abdominal pain    Anticipated Surgical Procedure:  EGD  Colonoscopy    The risks, benefits, and alternatives of this procedure have been discussed with the patient or the responsible party- the patient understands and agrees to proceed.

## 2025-07-31 NOTE — DISCHARGE INSTRUCTIONS
For the next 24 hours patient needs to be with a responsible adult.    For 24 hours DO NOT drive, operate machinery, appliances, drink alcohol, make important decisions or sign legal documents.    Start with a light or bland diet if you are feeling sick to your stomach otherwise advance to regular diet as tolerated.    Follow recommendations on procedure report if provided by your doctor.    Call Dr Figueroa for problems [phone]    Problems may include but not limited to: large amounts of bleeding, trouble breathing, repeated vomiting, severe unrelieved pain, fever or chills.

## 2025-08-04 ENCOUNTER — SPECIALTY PHARMACY (OUTPATIENT)
Dept: ENDOCRINOLOGY | Age: 55
End: 2025-08-04
Payer: COMMERCIAL

## 2025-08-04 LAB
CYTO UR: NORMAL
LAB AP CASE REPORT: NORMAL
PATH REPORT.FINAL DX SPEC: NORMAL
PATH REPORT.GROSS SPEC: NORMAL

## 2025-08-26 ENCOUNTER — SPECIALTY PHARMACY (OUTPATIENT)
Dept: ENDOCRINOLOGY | Age: 55
End: 2025-08-26
Payer: COMMERCIAL

## 2025-08-27 ENCOUNTER — OFFICE VISIT (OUTPATIENT)
Dept: ENDOCRINOLOGY | Age: 55
End: 2025-08-27
Payer: COMMERCIAL

## 2025-08-27 VITALS
SYSTOLIC BLOOD PRESSURE: 126 MMHG | HEART RATE: 83 BPM | WEIGHT: 187.8 LBS | BODY MASS INDEX: 29.47 KG/M2 | HEIGHT: 67 IN | OXYGEN SATURATION: 98 % | DIASTOLIC BLOOD PRESSURE: 74 MMHG

## 2025-08-27 DIAGNOSIS — E11.65 TYPE 2 DIABETES MELLITUS WITH HYPERGLYCEMIA, WITH LONG-TERM CURRENT USE OF INSULIN: ICD-10-CM

## 2025-08-27 DIAGNOSIS — Z79.4 TYPE 2 DIABETES MELLITUS WITH HYPERGLYCEMIA, WITH LONG-TERM CURRENT USE OF INSULIN: ICD-10-CM

## 2025-08-27 RX ORDER — GLIPIZIDE 5 MG/1
5 TABLET, FILM COATED, EXTENDED RELEASE ORAL
Qty: 90 TABLET | Refills: 1 | Status: SHIPPED | OUTPATIENT
Start: 2025-08-27

## 2025-08-28 LAB
ALBUMIN/CREAT UR: 72 MG/G CREAT (ref 0–29)
BUN SERPL-MCNC: 16 MG/DL (ref 6–24)
BUN/CREAT SERPL: 23 (ref 9–23)
CALCIUM SERPL-MCNC: 9.4 MG/DL (ref 8.7–10.2)
CHLORIDE SERPL-SCNC: 101 MMOL/L (ref 96–106)
CO2 SERPL-SCNC: 24 MMOL/L (ref 20–29)
CREAT SERPL-MCNC: 0.69 MG/DL (ref 0.57–1)
CREAT UR-MCNC: 44.7 MG/DL
EGFRCR SERPLBLD CKD-EPI 2021: 101 ML/MIN/1.73
GLUCOSE SERPL-MCNC: 198 MG/DL (ref 70–99)
HBA1C MFR BLD: 9.5 % (ref 4.8–5.6)
MICROALBUMIN UR-MCNC: 32.1 UG/ML
POTASSIUM SERPL-SCNC: 4.5 MMOL/L (ref 3.5–5.2)
SODIUM SERPL-SCNC: 140 MMOL/L (ref 134–144)

## (undated) DEVICE — TUBING, SUCTION, 1/4" X 10', STRAIGHT: Brand: MEDLINE

## (undated) DEVICE — LN SMPL CO2 SHTRM SD STREAM W/M LUER

## (undated) DEVICE — BLCK/BITE BLOX W/DENTL/RIM W/STRAP 54F

## (undated) DEVICE — SENSR O2 OXIMAX FNGR A/ 18IN NONSTR

## (undated) DEVICE — LASSO POLYPECTOMY SNARE: Brand: LASSO

## (undated) DEVICE — CANN O2 ETCO2 FITS ALL CONN CO2 SMPL A/ 7IN DISP LF

## (undated) DEVICE — THE SINGLE USE ETRAP – POLYP TRAP IS USED FOR SUCTION RETRIEVAL OF ENDOSCOPICALLY REMOVED POLYPS.: Brand: ETRAP

## (undated) DEVICE — KT ORCA ORCAPOD DISP STRL

## (undated) DEVICE — SINGLE-USE BIOPSY FORCEPS: Brand: RADIAL JAW 4

## (undated) DEVICE — ADAPT CLN BIOGUARD AIR/H2O DISP